# Patient Record
Sex: FEMALE | Race: WHITE | NOT HISPANIC OR LATINO | Employment: FULL TIME | URBAN - METROPOLITAN AREA
[De-identification: names, ages, dates, MRNs, and addresses within clinical notes are randomized per-mention and may not be internally consistent; named-entity substitution may affect disease eponyms.]

---

## 2022-05-11 PROBLEM — E66.813 CLASS 3 SEVERE OBESITY WITH SERIOUS COMORBIDITY AND BODY MASS INDEX (BMI) OF 40.0 TO 44.9 IN ADULT: Status: ACTIVE | Noted: 2022-05-11

## 2022-05-11 PROBLEM — E66.813 CLASS 3 SEVERE OBESITY WITH SERIOUS COMORBIDITY AND BODY MASS INDEX (BMI) OF 40.0 TO 44.9 IN ADULT: Chronic | Status: ACTIVE | Noted: 2022-05-11

## 2024-01-02 ENCOUNTER — OFFICE VISIT (OUTPATIENT)
Dept: FAMILY MEDICINE CLINIC | Facility: CLINIC | Age: 39
End: 2024-01-02
Payer: COMMERCIAL

## 2024-01-02 VITALS
WEIGHT: 258.6 LBS | SYSTOLIC BLOOD PRESSURE: 134 MMHG | HEART RATE: 84 BPM | HEIGHT: 64 IN | TEMPERATURE: 98.2 F | OXYGEN SATURATION: 98 % | BODY MASS INDEX: 44.15 KG/M2 | DIASTOLIC BLOOD PRESSURE: 68 MMHG

## 2024-01-02 DIAGNOSIS — Z72.0 TOBACCO ABUSE: ICD-10-CM

## 2024-01-02 DIAGNOSIS — I10 ESSENTIAL HYPERTENSION: Chronic | ICD-10-CM

## 2024-01-02 DIAGNOSIS — E66.01 CLASS 3 SEVERE OBESITY WITH SERIOUS COMORBIDITY AND BODY MASS INDEX (BMI) OF 40.0 TO 44.9 IN ADULT, UNSPECIFIED OBESITY TYPE: Chronic | ICD-10-CM

## 2024-01-02 DIAGNOSIS — Z3A.08 8 WEEKS GESTATION OF PREGNANCY: ICD-10-CM

## 2024-01-02 DIAGNOSIS — O24.419 GESTATIONAL DIABETES MELLITUS (GDM), ANTEPARTUM, GESTATIONAL DIABETES METHOD OF CONTROL UNSPECIFIED: Primary | Chronic | ICD-10-CM

## 2024-01-02 LAB
EXPIRATION DATE: NORMAL
HBA1C MFR BLD: 5 % (ref 4.5–5.7)
Lab: NORMAL

## 2024-01-02 RX ORDER — LABETALOL 200 MG/1
TABLET, FILM COATED ORAL EVERY 12 HOURS SCHEDULED
COMMUNITY
Start: 2023-12-15

## 2024-01-02 NOTE — PROGRESS NOTES
"Subjective   Darlene Lira is a 38 y.o. female.     History of Present Illness she has had gest diab with both preg.  She saw someone 2 weeks ago.  She is worried about diabetes with preg.  She is about 8 weeks.  She has been having some soa with quitting smoking.      The following portions of the patient's history were reviewed and updated as appropriate: allergies, current medications, past family history, past medical history, past social history, past surgical history, and problem list.    Review of Systems   Constitutional: Negative.    HENT: Negative.     Eyes: Negative.    Respiratory:  Positive for shortness of breath.    Cardiovascular: Negative.  Negative for chest pain and palpitations.   Gastrointestinal: Negative.    Endocrine: Negative.    Genitourinary: Negative.    Musculoskeletal: Negative.    Skin: Negative.    Allergic/Immunologic: Negative.    Neurological: Negative.  Negative for headaches.   Hematological: Negative.    Psychiatric/Behavioral: Negative.     All other systems reviewed and are negative.      Objective     Vitals:    01/02/24 1512   BP: 134/68   Pulse: 84   Temp: 98.2 °F (36.8 °C)   TempSrc: Infrared   SpO2: 98%   Weight: 117 kg (258 lb 9.6 oz)   Height: 162.6 cm (64.02\")       Physical Exam  Vitals and nursing note reviewed.   Constitutional:       Appearance: She is well-developed. She is obese.   HENT:      Head: Normocephalic and atraumatic.   Eyes:      General:         Right eye: No discharge.         Left eye: No discharge.      Pupils: Pupils are equal, round, and reactive to light.   Cardiovascular:      Rate and Rhythm: Normal rate and regular rhythm.      Heart sounds: Normal heart sounds.   Pulmonary:      Effort: Pulmonary effort is normal.      Breath sounds: Normal breath sounds.   Abdominal:      General: Bowel sounds are normal.      Palpations: Abdomen is soft. There is no mass.      Tenderness: There is no abdominal tenderness.   Musculoskeletal:         " General: Normal range of motion.      Right shoulder: No swelling.      Cervical back: Normal range of motion and neck supple.   Skin:     General: Skin is warm and dry.      Nails: There is no clubbing.   Neurological:      Mental Status: She is alert and oriented to person, place, and time.      Deep Tendon Reflexes: Reflexes are normal and symmetric.   Psychiatric:         Behavior: Behavior normal.         Thought Content: Thought content normal.         Judgment: Judgment normal.         Assessment & Plan     Problem List Items Addressed This Visit          Cardiac and Vasculature    Essential hypertension (Chronic)    Relevant Medications    labetalol (NORMODYNE) 200 MG tablet       Endocrine and Metabolic    Gestational diabetes mellitus (GDM) - Primary (Chronic)    Relevant Orders    POC Glycosylated Hemoglobin (Hb A1C) (Completed)    Ambulatory Referral to Endocrinology    Ambulatory Referral to Obstetrics / Gynecology    Class 3 severe obesity with serious comorbidity and body mass index (BMI) of 40.0 to 44.9 in adult (Chronic)       Gravid and     8 weeks gestation of pregnancy    Relevant Orders    Ambulatory Referral to Endocrinology    Ambulatory Referral to Obstetrics / Gynecology       Tobacco    Tobacco abuse       Poct A1c 5.0    Answers submitted by the patient for this visit:  Primary Reason for Visit (Submitted on 2023)  What is the primary reason for your visit?: High Blood Pressure

## 2024-01-09 ENCOUNTER — OFFICE VISIT (OUTPATIENT)
Dept: ENDOCRINOLOGY | Facility: CLINIC | Age: 39
End: 2024-01-09
Payer: COMMERCIAL

## 2024-01-09 VITALS
DIASTOLIC BLOOD PRESSURE: 74 MMHG | BODY MASS INDEX: 46.6 KG/M2 | HEART RATE: 86 BPM | WEIGHT: 263 LBS | OXYGEN SATURATION: 98 % | SYSTOLIC BLOOD PRESSURE: 152 MMHG | HEIGHT: 63 IN

## 2024-01-09 DIAGNOSIS — O24.419 GESTATIONAL DIABETES MELLITUS (GDM), ANTEPARTUM, GESTATIONAL DIABETES METHOD OF CONTROL UNSPECIFIED: Primary | Chronic | ICD-10-CM

## 2024-01-09 LAB
EXPIRATION DATE: NORMAL
GLUCOSE BLDC GLUCOMTR-MCNC: 109 MG/DL (ref 70–130)
Lab: NORMAL

## 2024-01-09 RX ORDER — LANCETS 30 GAUGE
EACH MISCELLANEOUS
Qty: 100 EACH | Refills: 8 | Status: SHIPPED | OUTPATIENT
Start: 2024-01-09

## 2024-01-09 RX ORDER — CHLORPHENIR/PHENYLEPH/ASPIRIN 2-7.8-325
1 TABLET, EFFERVESCENT ORAL DAILY
Qty: 50 STRIP | Refills: 5 | Status: SHIPPED | OUTPATIENT
Start: 2024-01-09

## 2024-01-09 RX ORDER — GLUCOSAMINE HCL/CHONDROITIN SU 500-400 MG
CAPSULE ORAL
Qty: 150 EACH | Refills: 9 | Status: SHIPPED | OUTPATIENT
Start: 2024-01-09

## 2024-01-09 NOTE — PROGRESS NOTES
Darlene Lira 38 y.o.  CC:Establish Care (New patient being seen at the request of Hodan Mohr MD for a consultation regarding hx of GD and now pregnant, DAVID 2024 OB: Morena Penn MD)      [place]: Establish Care (New patient being seen at the request of Hodan Mohr MD for a consultation regarding hx of GD and now pregnant, DAVID 2024 OB: Morena Penn MD)    Is  with 6 yo (7 lbs) and 8 yo (8 lbs)   Took basal bolus insulin with 8 yo, basal only with 6 yo   Currently 9 weeks pregnant   Both parents with blood sugar issues- father pre diabetic and mother with T2  Also known high bp - changed from losartan to normodyne  High initial bp here but repeat 128/64  She has not had morning meds yet  90 day average blood sugar recently / early pregnancy normal at 5.0%  She is not testing yet  We discussed the diagnosis of gestational diabetes and reviewed her glucose levels/ glucose tolerance test.  We discussed the concept of carbohydrate awaredness and carbohydrate content of meals was discussed.  Impact of sweets and other carb containing foods and types of foods typically high in carbohydrates was discussed. Overall guidelines for meal planning related to specific carbohydrate content of meals was discussed and she was provided recommendations about carbohydrates recommended with meals and with snacks.  She was asked to give up any sugared drinks, and avoid breakfast cereal.  Blood sugar testing fasting and after each meal was reviewed and the patient was taught to use a glucometer to test her blood sugar.  Normal values for blood sugar monitoring were discussed as well, with fasting level less than 95, 1 hour pp blood sugar less than 140 and 2 hour blood sugar less than 120 recommended.  Risks related to poor control of blood sugars during pregnancy were discussed with a focus on specific risks to both her and the baby.  Risks discussed include macrosomia (large birthweight), fetal lung immaturity  with respiratory distress and low oxygen level, stillbirth, low blood sugar after delivery, high bilirubin/jaundice, and hypocalcemia.  Use of medications during pregnancy (eg metformin, glyburide and insulin) was discussed.  Her long term risks (outside of pregnancy) for development of Diabetes Mellitus were reviewed and we discussed the importance of healthy lifestyle now and in the future to help reduce the risk of progression to diabetes.      No Known Allergies    Current Outpatient Medications:     Acetone, Urine, Test (Ketone Test) strip, 1 strip by In Vitro route Daily., Disp: 50 strip, Rfl: 5    Blood Glucose Monitoring Suppl w/Device kit, Use daily to test blood sugars, Disp: 1 each, Rfl: 0    cetirizine (zyrTEC) 10 MG tablet, Take 1 tablet by mouth Daily., Disp: , Rfl:     Glucose Blood (Blood Glucose Test) strip, Test blood sugars QID, Disp: 150 each, Rfl: 9    labetalol (NORMODYNE) 200 MG tablet, Every 12 (Twelve) Hours., Disp: , Rfl:     Lancets misc, Test blood sugars QID, Disp: 100 each, Rfl: 8    Turmeric 500 MG capsule, Take  by mouth Daily. With black pepper, Disp: , Rfl:   Patient Active Problem List    Diagnosis     8 weeks gestation of pregnancy [Z3A.08]     Annual physical exam [Z00.00]     Class 3 severe obesity with serious comorbidity and body mass index (BMI) of 40.0 to 44.9 in adult [E66.01, Z68.41]     Tobacco abuse [Z72.0]     Essential hypertension [I10]     Carpal tunnel syndrome of right wrist [G56.01]     Gestational diabetes mellitus (GDM) [O24.419]     Gestational diabetes [O24.419]      Review of Systems   Constitutional:  Negative for activity change, appetite change and unexpected weight change.   HENT:  Negative for congestion and rhinorrhea.    Eyes:  Negative for visual disturbance.   Respiratory:  Positive for shortness of breath. Negative for cough.    Cardiovascular:  Negative for palpitations and leg swelling.   Gastrointestinal:  Negative for constipation, diarrhea and  "nausea.   Genitourinary:  Negative for hematuria.   Musculoskeletal:  Negative for arthralgias, back pain, gait problem, joint swelling and myalgias.   Skin:  Negative for color change, rash and wound.   Allergic/Immunologic: Negative for environmental allergies, food allergies and immunocompromised state.   Neurological:  Negative for dizziness, weakness and light-headedness.   Psychiatric/Behavioral:  Negative for confusion, decreased concentration, dysphoric mood and sleep disturbance. The patient is not nervous/anxious.      Social History     Socioeconomic History    Marital status: Single   Tobacco Use    Smokeless tobacco: Never   Vaping Use    Vaping Use: Never used   Substance and Sexual Activity    Alcohol use: Yes     Comment: very rare    Drug use: Never    Sexual activity: Yes     Partners: Male     Family History   Problem Relation Age of Onset    Diabetes Mother     Hypertension Mother     Hypertension Father      /74   Pulse 86   Ht 160 cm (63\")   Wt 119 kg (263 lb)   LMP 05/01/2023   SpO2 98%   BMI 46.59 kg/m²   Physical Exam  Vitals and nursing note reviewed.   Constitutional:       Appearance: Normal appearance. She is well-developed.   HENT:      Head: Normocephalic and atraumatic.   Eyes:      General: Lids are normal.      Extraocular Movements: Extraocular movements intact.      Conjunctiva/sclera: Conjunctivae normal.      Pupils: Pupils are equal, round, and reactive to light.   Neck:      Thyroid: No thyroid mass or thyromegaly.      Vascular: No carotid bruit.      Trachea: Trachea normal. No tracheal deviation.   Cardiovascular:      Rate and Rhythm: Normal rate and regular rhythm.      Heart sounds: Normal heart sounds. No murmur heard.     No friction rub. No gallop.   Pulmonary:      Effort: Pulmonary effort is normal. No respiratory distress.      Breath sounds: Normal breath sounds. No wheezing.   Musculoskeletal:         General: No deformity. Normal range of motion.     "  Cervical back: Normal range of motion and neck supple.   Lymphadenopathy:      Cervical: No cervical adenopathy.   Skin:     General: Skin is warm and dry.      Findings: No erythema or rash.      Nails: There is no clubbing.   Neurological:      General: No focal deficit present.      Mental Status: She is alert and oriented to person, place, and time.      Cranial Nerves: No cranial nerve deficit.      Deep Tendon Reflexes: Reflexes are normal and symmetric. Reflexes normal.   Psychiatric:         Speech: Speech normal.         Behavior: Behavior normal.         Thought Content: Thought content normal.         Judgment: Judgment normal.       Results for orders placed or performed in visit on 01/09/24   POC Glucose    Specimen: Blood   Result Value Ref Range    Glucose 109 70 - 130 mg/dL    Lot Number [id number]     Expiration Date 06/30/2024      Diagnoses and all orders for this visit:    1. Gestational diabetes mellitus (GDM), antepartum, gestational diabetes method of control unspecified (Primary)  Assessment & Plan:  She will begin testing sugars fasting and 2 hours after meals and will fax blood sugars weekly via [employer]. It is likely she will need insulin again this pregnancy.  We discussed sensor use if she prefers- she will consider this.  We will plan to see her back in 3-4 weeks, or sooner if problems or questions.  Thank you for this referral and please do not hesitate to call for any problems or questions.    Orders:  -     POC Glucose    Other orders  -     Glucose Blood (Blood Glucose Test) strip; Test blood sugars QID  Dispense: 150 each; Refill: 9  -     Blood Glucose Monitoring Suppl w/Device kit; Use daily to test blood sugars  Dispense: 1 each; Refill: 0  -     Lancets misc; Test blood sugars QID  Dispense: 100 each; Refill: 8  -     Acetone, Urine, Test (Ketone Test) strip; 1 strip by In Vitro route Daily.  Dispense: 50 strip; Refill: 5    Return in about 6 weeks (around 2/20/2024) for  [place].    Madalyn Cleveland MD  Signed Madalyn Cleveland MD

## 2024-01-09 NOTE — ASSESSMENT & PLAN NOTE
She will begin testing sugars fasting and 2 hours after meals and will fax blood sugars weekly via [employer]. It is likely she will need insulin again this pregnancy.  We discussed sensor use if she prefers- she will consider this.  We will plan to see her back in 3-4 weeks, or sooner if problems or questions.  Thank you for this referral and please do not hesitate to call for any problems or questions.

## 2024-01-11 NOTE — TELEPHONE ENCOUNTER
Rx Refill Note  Requested Prescriptions     Pending Prescriptions Disp Refills    labetalol (NORMODYNE) 200 MG tablet       Sig: Every 12 (Twelve) Hours.      Last office visit with prescribing clinician: 1/2/2024   Last telemedicine visit with prescribing clinician: Visit date not found   Next office visit with prescribing clinician: Visit date not found                         Would you like a call back once the refill request has been completed: [] Yes [] No    If the office needs to give you a call back, can they leave a voicemail: [] Yes [] No    Dorina Li  01/11/24, 08:11 EST

## 2024-01-15 RX ORDER — LABETALOL 200 MG/1
200 TABLET, FILM COATED ORAL EVERY 12 HOURS SCHEDULED
Qty: 60 TABLET | Refills: 1 | Status: SHIPPED | OUTPATIENT
Start: 2024-01-15

## 2024-01-24 ENCOUNTER — TELEPHONE (OUTPATIENT)
Dept: OBSTETRICS AND GYNECOLOGY | Facility: CLINIC | Age: 39
End: 2024-01-24
Payer: COMMERCIAL

## 2024-01-25 ENCOUNTER — INITIAL PRENATAL (OUTPATIENT)
Dept: OBSTETRICS AND GYNECOLOGY | Facility: CLINIC | Age: 39
End: 2024-01-25
Payer: COMMERCIAL

## 2024-01-25 VITALS — BODY MASS INDEX: 46.69 KG/M2 | SYSTOLIC BLOOD PRESSURE: 128 MMHG | WEIGHT: 263.6 LBS | DIASTOLIC BLOOD PRESSURE: 62 MMHG

## 2024-01-25 DIAGNOSIS — Z34.91 PRENATAL CARE IN FIRST TRIMESTER: Primary | ICD-10-CM

## 2024-01-25 DIAGNOSIS — Z72.0 TOBACCO ABUSE: ICD-10-CM

## 2024-01-25 DIAGNOSIS — Z86.32 HISTORY OF GESTATIONAL DIABETES: ICD-10-CM

## 2024-01-25 DIAGNOSIS — Z3A.13 13 WEEKS GESTATION OF PREGNANCY: ICD-10-CM

## 2024-01-25 DIAGNOSIS — I10 ESSENTIAL HYPERTENSION: Chronic | ICD-10-CM

## 2024-01-25 DIAGNOSIS — O30.049 TWIN PREGNANCY, DICHORIONIC/DIAMNIOTIC, UNSPECIFIED TRIMESTER: ICD-10-CM

## 2024-01-25 DIAGNOSIS — Z98.890 HISTORY OF LOOP ELECTRICAL EXCISION PROCEDURE (LEEP): ICD-10-CM

## 2024-01-25 PROBLEM — O24.419 GESTATIONAL DIABETES MELLITUS (GDM): Chronic | Status: RESOLVED | Noted: 2017-01-25 | Resolved: 2024-01-25

## 2024-01-25 PROBLEM — Z34.90 PREGNANCY: Status: ACTIVE | Noted: 2024-01-25

## 2024-01-25 PROBLEM — Z3A.08 8 WEEKS GESTATION OF PREGNANCY: Status: RESOLVED | Noted: 2024-01-02 | Resolved: 2024-01-25

## 2024-01-25 PROBLEM — Z00.00 ANNUAL PHYSICAL EXAM: Status: RESOLVED | Noted: 2023-05-15 | Resolved: 2024-01-25

## 2024-01-25 PROBLEM — G56.01 CARPAL TUNNEL SYNDROME OF RIGHT WRIST: Status: RESOLVED | Noted: 2020-06-16 | Resolved: 2024-01-25

## 2024-01-25 PROCEDURE — 0501F PRENATAL FLOW SHEET: CPT | Performed by: OBSTETRICS & GYNECOLOGY

## 2024-01-25 RX ORDER — PRENATAL VIT/IRON FUM/FOLIC AC 27MG-0.8MG
TABLET ORAL DAILY
COMMUNITY
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
## 2024-01-26 LAB
ABO GROUP BLD: NORMAL
ALT SERPL-CCNC: 13 IU/L (ref 0–32)
AST SERPL-CCNC: 14 IU/L (ref 0–40)
BASOPHILS # BLD AUTO: 0 X10E3/UL (ref 0–0.2)
BASOPHILS NFR BLD AUTO: 0 %
BLD GP AB SCN SERPL QL: NEGATIVE
BUN SERPL-MCNC: 9 MG/DL (ref 6–20)
CREAT SERPL-MCNC: 0.63 MG/DL (ref 0.57–1)
CREAT UR-MCNC: ABNORMAL MG/DL
EGFRCR SERPLBLD CKD-EPI 2021: 118 ML/MIN/1.73
EOSINOPHIL # BLD AUTO: 0.2 X10E3/UL (ref 0–0.4)
EOSINOPHIL NFR BLD AUTO: 3 %
ERYTHROCYTE [DISTWIDTH] IN BLOOD BY AUTOMATED COUNT: 13.9 % (ref 11.7–15.4)
HBV SURFACE AG SERPL QL IA: NEGATIVE
HCT VFR BLD AUTO: 31.6 % (ref 34–46.6)
HCV IGG SERPL QL IA: NON REACTIVE
HGB BLD-MCNC: 10.6 G/DL (ref 11.1–15.9)
HIV 1+2 AB+HIV1 P24 AG SERPL QL IA: NON REACTIVE
IMM GRANULOCYTES # BLD AUTO: 0.1 X10E3/UL (ref 0–0.1)
IMM GRANULOCYTES NFR BLD AUTO: 1 %
LDH SERPL L TO P-CCNC: 154 IU/L (ref 119–226)
LYMPHOCYTES # BLD AUTO: 1.7 X10E3/UL (ref 0.7–3.1)
LYMPHOCYTES NFR BLD AUTO: 23 %
MCH RBC QN AUTO: 30 PG (ref 26.6–33)
MCHC RBC AUTO-ENTMCNC: 33.5 G/DL (ref 31.5–35.7)
MCV RBC AUTO: 90 FL (ref 79–97)
MICROALBUMIN UR-MCNC: ABNORMAL
MONOCYTES # BLD AUTO: 0.7 X10E3/UL (ref 0.1–0.9)
MONOCYTES NFR BLD AUTO: 9 %
NEUTROPHILS # BLD AUTO: 5 X10E3/UL (ref 1.4–7)
NEUTROPHILS NFR BLD AUTO: 64 %
PLATELET # BLD AUTO: 284 X10E3/UL (ref 150–450)
RBC # BLD AUTO: 3.53 X10E6/UL (ref 3.77–5.28)
RH BLD: NEGATIVE
RPR SER QL: NON REACTIVE
RUBV IGG SERPL IA-ACNC: 1.53 INDEX
SPECIMEN STATUS: NORMAL
URATE SERPL-MCNC: 3.9 MG/DL (ref 2.6–6.2)
WBC # BLD AUTO: 7.7 X10E3/UL (ref 3.4–10.8)

## 2024-01-27 LAB
BACTERIA UR CULT: NORMAL
BACTERIA UR CULT: NORMAL

## 2024-01-29 LAB — REF LAB TEST METHOD: NORMAL

## 2024-02-12 RX ORDER — FLURBIPROFEN SODIUM 0.3 MG/ML
1 SOLUTION/ DROPS OPHTHALMIC NIGHTLY
Qty: 100 EACH | Refills: 1 | Status: SHIPPED | OUTPATIENT
Start: 2024-02-12

## 2024-02-20 ENCOUNTER — OFFICE VISIT (OUTPATIENT)
Dept: ENDOCRINOLOGY | Facility: CLINIC | Age: 39
End: 2024-02-20
Payer: COMMERCIAL

## 2024-02-20 VITALS
WEIGHT: 267.6 LBS | HEART RATE: 92 BPM | BODY MASS INDEX: 47.41 KG/M2 | SYSTOLIC BLOOD PRESSURE: 132 MMHG | DIASTOLIC BLOOD PRESSURE: 60 MMHG | HEIGHT: 63 IN | OXYGEN SATURATION: 97 %

## 2024-02-20 DIAGNOSIS — I10 ESSENTIAL HYPERTENSION: Primary | Chronic | ICD-10-CM

## 2024-02-20 DIAGNOSIS — R73.9 HYPERGLYCEMIA: ICD-10-CM

## 2024-02-20 PROCEDURE — 99213 OFFICE O/P EST LOW 20 MIN: CPT | Performed by: INTERNAL MEDICINE

## 2024-02-20 RX ORDER — FERROUS SULFATE 325(65) MG
325 TABLET ORAL
COMMUNITY

## 2024-02-20 NOTE — PROGRESS NOTES
Darlene Ganfield 38 y.o.  CC:Follow-up and Gestational Diabetes (DAVID 7-30-24 (twins))      [place]: Follow-up and Gestational Diabetes (DAVID 7-30-24 (twins))    Blood sugars reviewed  Doing well overall   First morning sugar under 100 this morning   Is 17 weeks currently   Bp is good    No Known Allergies    Current Outpatient Medications:     Blood Glucose Monitoring Suppl w/Device kit, Use daily to test blood sugars, Disp: 1 each, Rfl: 0    cetirizine (zyrTEC) 10 MG tablet, Take 1 tablet by mouth Daily., Disp: , Rfl:     ferrous sulfate 325 (65 FE) MG tablet, Take 1 tablet by mouth Daily With Breakfast., Disp: , Rfl:     FIBER ADULT GUMMIES PO, Take  by mouth. 2 per day, Disp: , Rfl:     Glucose Blood (Blood Glucose Test) strip, Test blood sugars QID, Disp: 150 each, Rfl: 9    Insulin Glargine (LANTUS SOLOSTAR) 100 UNIT/ML injection pen, Inject 6 Units under the skin into the appropriate area as directed Every Night. (Patient taking differently: Inject  under the skin into the appropriate area as directed Every Night. Inject up to 25units daily), Disp: 15 mL, Rfl: 1    Insulin Pen Needle (B-D UF III MINI PEN NEEDLES) 31G X 5 MM misc, Use 1 Units Every Night., Disp: 100 each, Rfl: 1    labetalol (NORMODYNE) 200 MG tablet, Take 1 tablet by mouth Every 12 (Twelve) Hours., Disp: 60 tablet, Rfl: 1    Lancets misc, Test blood sugars QID, Disp: 100 each, Rfl: 8    Prenatal Vit-Fe Fumarate-FA (prenatal vitamin 27-0.8) 27-0.8 MG tablet tablet, Take  by mouth Daily., Disp: , Rfl:     Turmeric 500 MG capsule, Take  by mouth Daily. With black pepper, Disp: , Rfl:   Patient Active Problem List    Diagnosis     Hyperglycemia [R73.9]     Pregnancy [Z34.90]     Twin pregnancy, dichorionic/diamniotic, unspecified trimester [O30.049]     History of loop electrical excision procedure (LEEP) [Z98.890]     History of gestational diabetes [Z86.32]     Class 3 severe obesity with serious comorbidity and body mass index (BMI) of 40.0 to  "44.9 in adult [E66.01, Z68.41]     Tobacco abuse [Z72.0]     Essential hypertension [I10]      Review of Systems   Constitutional:  Negative for activity change, appetite change and unexpected weight change.   HENT:  Negative for congestion.    Respiratory:  Negative for cough and shortness of breath.    Cardiovascular:  Negative for leg swelling.     Social History     Socioeconomic History    Marital status: Single   Tobacco Use    Smoking status: Former     Packs/day: 0.50     Years: 20.00     Additional pack years: 0.00     Total pack years: 10.00     Types: Cigarettes     Start date: 2004     Quit date: 12/15/2023     Years since quittin.1    Smokeless tobacco: Never   Vaping Use    Vaping Use: Never used   Substance and Sexual Activity    Alcohol use: Not Currently     Comment: very rare    Drug use: Never    Sexual activity: Yes     Partners: Male     Birth control/protection: None     Family History   Problem Relation Age of Onset    Hypertension Father     Other Father         prediabetes    Diabetes type II Mother     Hypertension Mother      /60   Pulse 92   Ht 160 cm (63\")   Wt 121 kg (267 lb 9.6 oz)   LMP 2023   SpO2 97%   BMI 47.40 kg/m²   Physical Exam  Vitals and nursing note reviewed.   Constitutional:       Appearance: Normal appearance. She is well-developed.   HENT:      Head: Normocephalic and atraumatic.   Eyes:      General: Lids are normal.      Extraocular Movements: Extraocular movements intact.      Conjunctiva/sclera: Conjunctivae normal.      Pupils: Pupils are equal, round, and reactive to light.   Neck:      Thyroid: No thyroid mass or thyromegaly.      Vascular: No carotid bruit.      Trachea: Trachea normal. No tracheal deviation.   Cardiovascular:      Rate and Rhythm: Normal rate and regular rhythm.      Heart sounds: Normal heart sounds. No murmur heard.     No friction rub. No gallop.   Pulmonary:      Effort: Pulmonary effort is normal. No respiratory " distress.      Breath sounds: Normal breath sounds. No wheezing.   Musculoskeletal:         General: No deformity. Normal range of motion.      Cervical back: Normal range of motion and neck supple.   Lymphadenopathy:      Cervical: No cervical adenopathy.   Skin:     General: Skin is warm and dry.      Findings: No erythema or rash.      Nails: There is no clubbing.   Neurological:      Mental Status: She is alert and oriented to person, place, and time.      Cranial Nerves: No cranial nerve deficit.      Deep Tendon Reflexes: Reflexes are normal and symmetric. Reflexes normal.   Psychiatric:         Speech: Speech normal.         Behavior: Behavior normal.         Thought Content: Thought content normal.         Judgment: Judgment normal.       Results for orders placed or performed in visit on 01/25/24   Urine Culture - Urine, Urine, Clean Catch    Specimen: Urine, Clean Catch    UC   Result Value Ref Range    Urine Culture Final report     Result 1 Comment    Obstetric Panel    Specimen: Blood    UC   Result Value Ref Range    Hepatitis B Surface Ag Negative Negative    Hep C Virus Ab Non Reactive Non Reactive    RPR Non Reactive Non Reactive    Rubella Antibodies, IgG 1.53 Immune >0.99 index    ABO Type O     Rh Factor Negative     Antibody Screen Negative Negative   HIV-1 / O / 2 Ag / Antibody    Specimen: Blood    UC   Result Value Ref Range    HIV Screen 4th Gen w/RFX (Reference) Non Reactive Non Reactive   Specimen Status Report    UC   Result Value Ref Range    Specimen Status Comment    Preeclampsia Panel    UC   Result Value Ref Range    Uric Acid 3.9 2.6 - 6.2 mg/dL    BUN 9 6 - 20 mg/dL    Creatinine 0.63 0.57 - 1.00 mg/dL    EGFR Result 118 >59 mL/min/1.73     119 - 226 IU/L    AST (SGOT) 14 0 - 40 IU/L    ALT (SGPT) 13 0 - 32 IU/L    Creatinine, Urine CANCELED     Microalbumin, Urine CANCELED     WBC 7.7 3.4 - 10.8 x10E3/uL    RBC 3.53 (L) 3.77 - 5.28 x10E6/uL    Hemoglobin 10.6 (L) 11.1 -  15.9 g/dL    Hematocrit 31.6 (L) 34.0 - 46.6 %    MCV 90 79 - 97 fL    MCH 30.0 26.6 - 33.0 pg    MCHC 33.5 31.5 - 35.7 g/dL    RDW 13.9 11.7 - 15.4 %    Platelets 284 150 - 450 x10E3/uL    Neutrophil Rel % 64 Not Estab. %    Lymphocyte Rel % 23 Not Estab. %    Monocyte Rel % 9 Not Estab. %    Eosinophil Rel % 3 Not Estab. %    Basophil Rel % 0 Not Estab. %    Neutrophils Absolute 5.0 1.4 - 7.0 x10E3/uL    Lymphocytes Absolute 1.7 0.7 - 3.1 x10E3/uL    Monocytes Absolute 0.7 0.1 - 0.9 x10E3/uL    Eosinophils Absolute 0.2 0.0 - 0.4 x10E3/uL    Basophils Absolute 0.0 0.0 - 0.2 x10E3/uL    Immature Granulocyte Rel % 1 Not Estab. %    Immature Grans Absolute 0.1 0.0 - 0.1 x10E3/uL   LIQUID-BASED PAP SMEAR WITH HPV GENOTYPING REGARDLESS OF INTERPRETATION (EMANI,COR,MAD)    Specimen: Cervix, Endocervix; ThinPrep Vial   Result Value Ref Range    Reference Lab Report       [facility]  [address] [place] [address]  Phone: [phone] or [phone]  Fax: [phone]  Néstor Huff M.D., Medical Director    PATIENT NAME                           LABORATORY NO.  RAUDEL CABALLERO.                  [id number]  [id number]                         AGE              SEX  SSN           CLIENT REF #  [facility]                         38      1985  F    [id number]   [id number]  [id number]                                                [place] [address]                REQUESTING M.D.     ATTENDING M.D.     COPY TO.  PEBBLES RAUSCH  DATE COLLECTED      DATE RECEIVED      DATE REPORTED  2024    ThinPrep Pap with [facility]    DIAGNOSIS:  Negative for intraepithelial lesion or malignancy    Multiple factors can influence accuracy of Pap tests; therefore, screening at  regular intervals is necessary for early cancer detection.      SPECIMEN  ADEQUACY:  SATISFACTORY FOR EVALUATION  Transformation zone is absent or  insufficient.  SOURCE OF SPECIMEN:  CERVICAL  SLIDES:  1  CLINICAL HISTORY:  Prenatal care in first trimester    HPV  HR-HPV POOL: Negative    The Aptima HPV assay is an in vitro nucleic acid amplification test for the  qualitative detection of E6/E7 viral messenger RNA from 14 high risk types of  HPV in cervical specimens. The high risk HPV types detected include: 16, 18,  31, 33, 35, 39, 45, 51, 52, 56, 58, 59, 66, 68    Chlamydia / Gonorrhea  CHLAMYDIA TRACHOMATIS: Negative  NEISSERIA GONORRHOEAE: Negative    The Aptima Combo 2 assay is a target amplification nucleic acid probe test that  utilizes target capture for the in vitro qualitative detection and differentiation of  ribosomal RNA from Chlamydia trachomatis and Neisseria gonorrhoeae to aid  in the diagnosis of chlamdial and gonococcal disease using the [place] system.    CYTOTECHNOLOGIST:      TRAV LOPEZ (ASCP)    CPT CODES:  35248, 88023, 33028, 26563       Diagnoses and all orders for this visit:    1. Essential hypertension (Primary)  Assessment & Plan:  Doing well on current therapy - continue follow       2. Hyperglycemia  Assessment & Plan:  Blood sugar and 90 day average sugar reviewed  No high A1c   Failed gtt with twin gestation   High fasting sugars- is titrating insulin as directed and is very good about reporting  Follow up 6-8 weeks with weekly blood sugar   Discussed expected changes as she nears 25 weeks       Return in about 8 weeks (around 4/16/2024) for Recheck.    Madalyn Cleveland MD  Signed Madalyn Cleveland MD

## 2024-02-21 PROBLEM — R73.9 HYPERGLYCEMIA: Status: ACTIVE | Noted: 2024-02-21

## 2024-02-21 NOTE — ASSESSMENT & PLAN NOTE
Blood sugar and 90 day average sugar reviewed  No high A1c   Failed gtt with twin gestation   High fasting sugars- is titrating insulin as directed and is very good about reporting  Follow up 6-8 weeks with weekly blood sugar   Discussed expected changes as she nears 25 weeks

## 2024-02-27 ENCOUNTER — ROUTINE PRENATAL (OUTPATIENT)
Dept: OBSTETRICS AND GYNECOLOGY | Facility: CLINIC | Age: 39
End: 2024-02-27
Payer: COMMERCIAL

## 2024-02-27 ENCOUNTER — TELEPHONE (OUTPATIENT)
Dept: OBSTETRICS AND GYNECOLOGY | Facility: CLINIC | Age: 39
End: 2024-02-27

## 2024-02-27 VITALS — WEIGHT: 264.6 LBS | BODY MASS INDEX: 46.87 KG/M2 | DIASTOLIC BLOOD PRESSURE: 62 MMHG | SYSTOLIC BLOOD PRESSURE: 128 MMHG

## 2024-02-27 DIAGNOSIS — O30.049 TWIN PREGNANCY, DICHORIONIC/DIAMNIOTIC, UNSPECIFIED TRIMESTER: ICD-10-CM

## 2024-02-27 DIAGNOSIS — Z3A.18 18 WEEKS GESTATION OF PREGNANCY: ICD-10-CM

## 2024-02-27 DIAGNOSIS — Z98.890 HISTORY OF LOOP ELECTRICAL EXCISION PROCEDURE (LEEP): ICD-10-CM

## 2024-02-27 DIAGNOSIS — O24.414 INSULIN CONTROLLED GESTATIONAL DIABETES MELLITUS (GDM) DURING PREGNANCY, ANTEPARTUM: ICD-10-CM

## 2024-02-27 DIAGNOSIS — I10 ESSENTIAL HYPERTENSION: Chronic | ICD-10-CM

## 2024-02-27 DIAGNOSIS — Z34.92 PRENATAL CARE IN SECOND TRIMESTER: Primary | ICD-10-CM

## 2024-02-27 DIAGNOSIS — Z72.0 TOBACCO ABUSE: ICD-10-CM

## 2024-02-27 PROBLEM — R73.9 HYPERGLYCEMIA: Status: RESOLVED | Noted: 2024-02-21 | Resolved: 2024-02-27

## 2024-02-27 PROCEDURE — 0502F SUBSEQUENT PRENATAL CARE: CPT | Performed by: OBSTETRICS & GYNECOLOGY

## 2024-02-27 NOTE — PROGRESS NOTES
OB FOLLOW UP  CC- Here for care of pregnancy        Darlene Lira is a 38 y.o.  18w0d patient being seen today for her obstetrical follow up visit. Patient reports her average fasting BG is 90-95. Her average 2hr PP BG is .  She reports nausea that occurs in the morning for the past few day.  She also reports swelling in lower extremities without pitting that occurs sporadically.    Patient denies checking b/p at home. She is unable to leave urine sample at today's visit. She brought in 24 hour urine at today's visit.     Her prenatal care is complicated by (and status) :   Patient Active Problem List   Diagnosis    Essential hypertension    Class 3 severe obesity with serious comorbidity and body mass index (BMI) of 40.0 to 44.9 in adult    Tobacco abuse    Pregnancy    Twin pregnancy, dichorionic/diamniotic, unspecified trimester    History of loop electrical excision procedure (LEEP)    Insulin controlled gestational diabetes mellitus (GDM) during pregnancy, antepartum       Flu Status: Declines  Ultrasound Today: Yes  AFP was declined.    ROS -     Patient Denies: leaking of fluid, vaginal bleeding, dysuria, excessive vomiting, and more than 6 contractions per hour  Fetal Movement : No  All other systems reviewed and are negative.       The additional following portions of the patient's history were reviewed and updated as appropriate: allergies, current medications, past family history, past medical history, past social history, past surgical history, and problem list.      I have reviewed and agree with the HPI, ROS, and historical information as entered above. Morena Penn MD      /62   Wt 120 kg (264 lb 9.6 oz)   LMP 2023   BMI 46.87 kg/m²       EXAM:     Prenatal Vitals  BP: 128/62  Weight: 120 kg (264 lb 9.6 oz)   Fetal Heart Rate: +/+                  Assessment and Plan    Problem List Items Addressed This Visit          Gynecologic and Obstetric Problems    Insulin  controlled gestational diabetes mellitus (GDM) during pregnancy, antepartum    Overview     On insulin with G1 and G2  Seeing Dr Cleveland, started FSBS monitoring 12 wks- started insulin.         Relevant Medications    Insulin Glargine (LANTUS SOLOSTAR) 100 UNIT/ML injection pen       Other    Essential hypertension (Chronic)    Overview     Labetelol 200 BID at nob  Baseline PEP, 24h urine  Baby asa         Relevant Medications    labetalol (NORMODYNE) 200 MG tablet    Tobacco abuse    Overview     QUIT with +upt         Pregnancy    Overview     2 prev  term  Preeclampsia with G1 at term         Twin pregnancy, dichorionic/diamniotic, unspecified trimester    History of loop electrical excision procedure (LEEP)     Other Visit Diagnoses       Prenatal care in second trimester    -  Primary    Relevant Orders    [facility] [place]            Pregnancy at 18w0d  Refer to [facility] for crystal US.   Returned 24h urine.   Taking iron. Repeat cbc next visit.   FSBS normal on insulin.   Fetal status reassuring.   Activity and Exercise discussed.  Patient is on Prenatal vitamins  Return in about 2 weeks (around 3/12/2024) for Refer - [facility], F/U Prenatal.      Morena Penn MD  2024

## 2024-02-28 LAB
PROT 24H UR-MRATE: 438 MG/24 HR (ref 30–150)
PROT UR-MCNC: 14.6 MG/DL

## 2024-03-14 ENCOUNTER — OFFICE VISIT (OUTPATIENT)
Dept: OBSTETRICS AND GYNECOLOGY | Facility: HOSPITAL | Age: 39
End: 2024-03-14
Payer: COMMERCIAL

## 2024-03-14 ENCOUNTER — ROUTINE PRENATAL (OUTPATIENT)
Dept: OBSTETRICS AND GYNECOLOGY | Facility: CLINIC | Age: 39
End: 2024-03-14
Payer: COMMERCIAL

## 2024-03-14 ENCOUNTER — HOSPITAL ENCOUNTER (OUTPATIENT)
Dept: WOMENS IMAGING | Facility: HOSPITAL | Age: 39
Discharge: HOME | End: 2024-03-14
Admitting: OBSTETRICS & GYNECOLOGY
Payer: COMMERCIAL

## 2024-03-14 VITALS — BODY MASS INDEX: 46.84 KG/M2 | DIASTOLIC BLOOD PRESSURE: 62 MMHG | SYSTOLIC BLOOD PRESSURE: 134 MMHG | WEIGHT: 264.4 LBS

## 2024-03-14 VITALS — SYSTOLIC BLOOD PRESSURE: 126 MMHG | BODY MASS INDEX: 46.41 KG/M2 | DIASTOLIC BLOOD PRESSURE: 56 MMHG | WEIGHT: 262 LBS

## 2024-03-14 DIAGNOSIS — O09.522 MULTIGRAVIDA OF ADVANCED MATERNAL AGE IN SECOND TRIMESTER: ICD-10-CM

## 2024-03-14 DIAGNOSIS — I10 ESSENTIAL HYPERTENSION: Chronic | ICD-10-CM

## 2024-03-14 DIAGNOSIS — Z34.92 PRENATAL CARE IN SECOND TRIMESTER: ICD-10-CM

## 2024-03-14 DIAGNOSIS — E66.01 CLASS 3 SEVERE OBESITY WITH SERIOUS COMORBIDITY AND BODY MASS INDEX (BMI) OF 40.0 TO 44.9 IN ADULT, UNSPECIFIED OBESITY TYPE: Chronic | ICD-10-CM

## 2024-03-14 DIAGNOSIS — O30.049 TWIN PREGNANCY, DICHORIONIC/DIAMNIOTIC, UNSPECIFIED TRIMESTER: Primary | ICD-10-CM

## 2024-03-14 DIAGNOSIS — O24.414 INSULIN CONTROLLED GESTATIONAL DIABETES MELLITUS (GDM) DURING PREGNANCY, ANTEPARTUM: ICD-10-CM

## 2024-03-14 DIAGNOSIS — Z82.79 FAMILY HISTORY OF VSD (VENTRICULAR SEPTAL DEFECT): ICD-10-CM

## 2024-03-14 DIAGNOSIS — Z98.890 HISTORY OF LOOP ELECTRICAL EXCISION PROCEDURE (LEEP): ICD-10-CM

## 2024-03-14 PROCEDURE — 76812 OB US DETAILED ADDL FETUS: CPT

## 2024-03-14 PROCEDURE — 76811 OB US DETAILED SNGL FETUS: CPT

## 2024-03-14 NOTE — ASSESSMENT & PLAN NOTE
Patient with a history of cervical procedures already increased risk of short cervix and  birth.  Patient had additional high risk of spontaneous  birth secondary to twin gestation.  Cervical length today

## 2024-03-14 NOTE — ASSESSMENT & PLAN NOTE
Patient with a history of gestational diabetes requiring insulin x 2 and patient already requiring insulin during this pregnancy.  A1c at the beginning of pregnancy was normal though suspicion for potential type II given insulin requirements.  Patient currently follows with endocrinology and is on Lantus 26 units at night    GLUCOSE MONITORING   Target plasma blood glucose values in pregnancy are less than 90 mg/dL fasting, and less than 120 mg/dL at 2 hour postprandially or 140 mg/dL at 1 hour postprandially.     MATERNAL RISKS  Insulin resistance places women at higher risk for preeclampsia.  Those with gestational diabetes are also at higher risk of developing diabetes in their lifetime outside of pregnancy.  A 2-hour oral glucose tolerance test at 6 weeks postpartum should be completed to clarify a diagnosis of pregestational diabetes.  Screening for diabetes should be performed every 1-3 years after that.    FETAL AND  RISKS  Diabetes in pregnancy increases risk of macrosomia, with associated birth injuries including nerve palsies, fractures and cephalhematoma.  Macrosomia increases the need for  delivery and has been linked to the development of adolescent obesity.  Glycemic control is important to reducing these risks.       morbidities among infants of diabetic mothers include hypoglycemia, hyperbilirubinemia, and metabolic instability.  In addition, respiratory distress can result from delayed fetal lung maturity.    Intrapartum glycemic control to maintain maternal glucose levels within a narrow range (between  mg/dL) reduces risks of  asphyxia and  hypoglycemia.  Close monitoring of the  is necessary.      RECOMMENDATIONS:  -Continue keeping glucose log.  Would be happy to follow along with blood sugars if desired by endocrinology, primary OB/GYN, patient  -Patient will have growth ultrasounds every month  - Elective  delivery for suspected  macrosomia at estimated fetal weight of greater than 4500 grams.   - Plan for 2-hour OGTT at 6 weeks postpartum.   - Continuation of lifestyle changes PP to lower risk of type 2 DM in the future.

## 2024-03-14 NOTE — PROGRESS NOTES
"    OB FOLLOW UP  CC- Here for care of pregnancy        Darlene Lira is a 38 y.o.  20w2d patient being seen today for her obstetrical follow up visit. Patient reports her average fasting BG is . Her average 2hr PP BG is 88-95.. She is currently taking 26 units of Lantus. Pt does not monitor BP at home. Takes Labetalol 200mg BID. Denies any complaints today.     Pt returned 24H urine last visit and would like to discuss results.     Her prenatal care is complicated by (and status) :   Patient Active Problem List   Diagnosis    Essential hypertension    Class 3 severe obesity with serious comorbidity and body mass index (BMI) of 40.0 to 44.9 in adult    Tobacco abuse    Pregnancy    Twin pregnancy, dichorionic/diamniotic, unspecified trimester    History of loop electrical excision procedure (LEEP)    Insulin controlled gestational diabetes mellitus (GDM) during pregnancy, antepartum    Multigravida of advanced maternal age in second trimester    Family history of VSD (ventricular septal defect)       Flu Status: Declines    Ultrasound Today: Yes at [facility]  \"DC/DA twins in breech-breech presentation  Adequate growth of both twins  Intertwin growth is concordant (difference 9%)  Amniotic fluid is concordant   Normal appearing fetal anatomic survey of each twin though limited secondary to twin gestation, early gestation, maternal body habitus  No placenta previa  Normal cervical length\"    AFP was declined.    ROS -     Patient Denies: leaking of fluid, vaginal bleeding, dysuria, excessive vomiting, and more than 6 contractions per hour  Fetal Movement : Yes  All other systems reviewed and are negative.       The additional following portions of the patient's history were reviewed and updated as appropriate: allergies, current medications, past family history, past medical history, past social history, past surgical history, and problem list.      I have reviewed and agree with the HPI, ROS, and " historical information as entered above. Cecy Young, APRN      /62   Wt 120 kg (264 lb 6.4 oz)   LMP 2023   BMI 46.84 kg/m²       EXAM:     Prenatal Vitals  BP: 134/62  Weight: 120 kg (264 lb 6.4 oz)   Fetal Heart Rate: 149/142                  Assessment and Plan    Problem List Items Addressed This Visit          Cardiac and Vasculature    Essential hypertension (Chronic)    Overview     Labetelol 200 BID at nob  Baseline PEP, 24h urine  Baby asa         Relevant Medications    labetalol (NORMODYNE) 200 MG tablet    Other Relevant Orders    Cuffed BP Gauge ANEROID ANEROID Gauge Adult Large       Endocrine and Metabolic    Insulin controlled gestational diabetes mellitus (GDM) during pregnancy, antepartum    Overview     On insulin with G1 and G2  Seeing Dr Cleveland, started FSBS monitoring 12 wks- started insulin.         Relevant Medications    Insulin Glargine (LANTUS SOLOSTAR) 100 UNIT/ML injection pen       Gravid and     Twin pregnancy, dichorionic/diamniotic, unspecified trimester - Primary    Relevant Orders    POC Urinalysis Dipstick    Multigravida of advanced maternal age in second trimester       Pregnancy at 20w2d  Anatomy scan today is incomplete, follow up in 4 weeks for additional views. Anatomy that was visualized was within normal limits. Follow up at [facility] in four weeks for repeat views of hearts and spines  Fetal status reassuring.   Activity and Exercise discussed.  U/S ordered at follow up  Patient is on Prenatal vitamins  Discussed bASA for PIH prevention from 12 to 36wk  Rx bp cuff-instructed patient to take her bp prior to taking  her medication every day and record. Call if consistently over 140/90 with bp meds.  Reviewed Pre-eclampsia signs/symptoms-Headache not relieved by tylenol or rest, chest pain, shortness of breath, right upper quadrant pain, blurry vision. Monitor blood pressure at home call provider if above 140/90 consistently.     Follow up four  weeks.    Cecy Young, APRN  03/14/2024

## 2024-03-14 NOTE — PROGRESS NOTES
Pt has an appt with Dr. Penn today. Next appt with Dr. Cleveland on 4/16/24. Pt denies vaginal bleeding or abdominal pain. Pt has history of CHTN as of 2020. Pt reports her son was born with VSD and murmur, resolved. Hx preeclampsia. Pt is currently taking Lantus 26 units qHS and Labetalol 200 mg bid.

## 2024-03-14 NOTE — ASSESSMENT & PLAN NOTE
Regarding her Dichorionic - diamniotic twin pregnancy we discussed that twins are at an increased risk for  birth, gestational diabetes, and preeclampsia in pregnancy.  Dichorionic twins have less complications overall when compared to monochorionic twins but are increased from aquino pregnancies.  On average twins will delivery around 36 weeks. If you get to 38 weeks, we would recommend delivery. The only proven prevention strategy for preeclampsia is low dose aspirin during pregnancy, which we recommended today.  We discussed delivery of twin depends on position of baby A at delivery time and preferences of your delivery provider.    Recommendations:  - ASA 81 mg supplementation  - Baseline pre-eclampsia labs: serum labs wnl, 24 hour urine elevated at 428.  -Monthly growth ultrasounds  -Twice weekly NSTs at 32 weeks  -Delivery between 37-38 weeks gestation

## 2024-03-14 NOTE — ASSESSMENT & PLAN NOTE
Patient currently on labetalol 200 mg twice daily.  Blood pressure today 126/56.  No reason to make medication change at this time.    Pt has a diagnosis of chronic hypertension.  Initial evaluation in these women should include renal function by 24 hr urine for protein and creatinine clearance.  HTN is associated with Fetal Growth Restriction (FGR) in about 15-25% of cases. HTN progresses to superimposed preeclampsia in about 25-35% of cases, usually recognized by the appearance of proteinuria & worsening hypertension.  These interventions do not decrease the risk of superimposed preeclampsia: prophylactic bedrest, work & activity restrictions, dietary changes, nutrient supplements, or prophylactic antihypertensive therapy.      The only proven prevention strategy for preeclampsia is low dose aspirin during pregnancy.   In fact, the USPTF recommends low dose aspirin initiation after 12 wks gestation for preeclampsia prevention in these women.  We have instructed patient to check a blood pressure log and to report BPs 160/110s. With recent publishing of the CHAP trial, the goal for mild chronic hypertension is blood pressures <140/80. This trial showed decreased maternal morbidity with no change in  morbidity.    Recommendations:  -low dose aspirin  -Patient will be receiving monthly growth ultrasound secondary to multiple comorbidities.  - testing at 32 wks unless indication develops prior (usually with twice weekly NST's)   - Delivery based on BP control/complications but ideally at term

## 2024-03-14 NOTE — ASSESSMENT & PLAN NOTE
At age 35 and over, women are at increased risk for chromosome abnormalities, as well as at an increased risk for obstetric complications.  The patient is therefore a candidate for diagnostic (amnio or CVS) or screening (cell free fetal DNA or NIPT, nuchal translucency screening, or serum multiple markers) testing.  We discussed genetic testing and she currently declines all testing    She will be 38 years old at the time of delivery.  This will give her a risk of Down syndrome of 1/267 and a risk of any aneuploidy of 1/148.  She understands that ultrasound and/or serum screening cannot diagnose or rule out fetal aneuploidy.

## 2024-03-14 NOTE — ASSESSMENT & PLAN NOTE
Obesity increases the risk of many pregnancy complications including SAB, congenital anomalies (even in those without diabetes), macrosomia, diabetes, hypertension and preeclampsia, stillbirth, and  delivery. Obesity also decreases detection rates of fetal abnormalities on  ultrasound.  Ideal weight gain obese patients in pregnancy is 11-20 pounds; the [place] [employer] does not stratify weight gain by class of obesity.
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
## 2024-03-14 NOTE — ASSESSMENT & PLAN NOTE
Patient has a son that was born with a VSD.  Given this fetus has a first-degree relative with a congenital heart defect and potential concern for type 2 diabetes we will perform fetal echo at 24 weeks.

## 2024-03-18 RX ORDER — LABETALOL 200 MG/1
200 TABLET, FILM COATED ORAL EVERY 12 HOURS SCHEDULED
Qty: 60 TABLET | Refills: 1 | Status: SHIPPED | OUTPATIENT
Start: 2024-03-18

## 2024-03-18 NOTE — TELEPHONE ENCOUNTER
Rx Refill Note  Requested Prescriptions     Pending Prescriptions Disp Refills    labetalol (NORMODYNE) 200 MG tablet 60 tablet 1     Sig: Take 1 tablet by mouth Every 12 (Twelve) Hours.      Last office visit with prescribing clinician: 1/2/2024   Last telemedicine visit with prescribing clinician: Visit date not found   Next office visit with prescribing clinician: Visit date not found                         Would you like a call back once the refill request has been completed: [] Yes [] No    If the office needs to give you a call back, can they leave a voicemail: [] Yes [] No    Asmita Hutson MA  03/18/24, 07:57 EDT

## 2024-04-16 ENCOUNTER — OFFICE VISIT (OUTPATIENT)
Dept: ENDOCRINOLOGY | Facility: CLINIC | Age: 39
End: 2024-04-16
Payer: COMMERCIAL

## 2024-04-16 VITALS
DIASTOLIC BLOOD PRESSURE: 72 MMHG | OXYGEN SATURATION: 98 % | HEART RATE: 94 BPM | SYSTOLIC BLOOD PRESSURE: 150 MMHG | WEIGHT: 269.6 LBS | HEIGHT: 63 IN | BODY MASS INDEX: 47.77 KG/M2

## 2024-04-16 DIAGNOSIS — O24.414 INSULIN CONTROLLED GESTATIONAL DIABETES MELLITUS (GDM) DURING PREGNANCY, ANTEPARTUM: Primary | ICD-10-CM

## 2024-04-16 PROCEDURE — 99213 OFFICE O/P EST LOW 20 MIN: CPT | Performed by: INTERNAL MEDICINE

## 2024-04-16 RX ORDER — ASPIRIN 81 MG/1
81 TABLET ORAL DAILY
COMMUNITY

## 2024-04-16 NOTE — LETTER
April 16, 2024       No Recipients    Patient: Darlene Lira   YOB: 1985   Date of Visit: 4/16/2024     Dear Morena Penn MD:       Thank you for referring Darlene Lira to me for evaluation. Below are the relevant portions of my assessment and plan of care.    If you have questions, please do not hesitate to call me. I look forward to following Darlene along with you.         Sincerely,        Madalyn Cleveland MD        CC:   No Recipients    Madalyn Cleveland MD  04/16/24 1629  Incomplete  Darlene Lira 38 y.o.  CC:Follow-up and Gestational Diabetes (DAVID  7-30-24 (twins))    [place]: Follow-up and Gestational Diabetes (DAVID  7-30-24 (twins))    Is taking lantus 38 u daily   Fasting sugars 77-95  Pp sugars 85- 118   Babies moving well   Is seeing OB and PDC   Bp high today- recheck left arm 132/60    No Known Allergies    Current Outpatient Medications:   •  aspirin 81 MG EC tablet, Take 1 tablet by mouth Daily., Disp: , Rfl:   •  Blood Glucose Monitoring Suppl w/Device kit, Use daily to test blood sugars, Disp: 1 each, Rfl: 0  •  cetirizine (zyrTEC) 10 MG tablet, Take 1 tablet by mouth Daily., Disp: , Rfl:   •  ferrous sulfate 325 (65 FE) MG tablet, Take 1 tablet by mouth Daily With Breakfast., Disp: , Rfl:   •  FIBER ADULT GUMMIES PO, Take  by mouth. 2 per day, Disp: , Rfl:   •  Glucose Blood (Blood Glucose Test) strip, Test blood sugars QID, Disp: 150 each, Rfl: 9  •  Insulin Glargine (LANTUS SOLOSTAR) 100 UNIT/ML injection pen, Inject 40 Units under the skin into the appropriate area as directed Every Night. Please fill early- patient has adjusted dose during pregnancy, Disp: 15 mL, Rfl: 1  •  Insulin Pen Needle (B-D UF III MINI PEN NEEDLES) 31G X 5 MM misc, Use 1 Units Every Night., Disp: 100 each, Rfl: 1  •  labetalol (NORMODYNE) 200 MG tablet, Take 1 tablet by mouth Every 12 (Twelve) Hours., Disp: 60 tablet, Rfl: 1  •  Lancets misc, Test blood sugars QID, Disp: 100 each,  Rfl: 8  •  Prenatal Vit-Fe Fumarate-FA (prenatal vitamin 27-0.8) 27-0.8 MG tablet tablet, Take  by mouth Daily., Disp: , Rfl:   •  Turmeric 500 MG capsule, Take  by mouth Daily. With black pepper, Disp: , Rfl:   Patient Active Problem List    Diagnosis    • Multigravida of advanced maternal age in second trimester [O09.522]    • Family history of VSD (ventricular septal defect) [Z82.79]    • Pregnancy [Z34.90]    • Twin pregnancy, dichorionic/diamniotic, unspecified trimester [O30.049]    • History of loop electrical excision procedure (LEEP) [Z98.890]    • Insulin controlled gestational diabetes mellitus (GDM) during pregnancy, antepartum [O24.414]    • Class 3 severe obesity with serious comorbidity and body mass index (BMI) of 40.0 to 44.9 in adult [E66.01, Z68.41]    • Tobacco abuse [Z72.0]    • Essential hypertension [I10]      Review of Systems   Constitutional:  Negative for activity change, appetite change and unexpected weight change.   HENT:  Negative for congestion and rhinorrhea.    Eyes:  Negative for visual disturbance.   Respiratory:  Negative for cough and shortness of breath.    Cardiovascular:  Positive for leg swelling. Negative for palpitations.   Gastrointestinal:  Negative for constipation, diarrhea and nausea.   Genitourinary:  Negative for hematuria.   Musculoskeletal:  Negative for arthralgias, back pain, gait problem, joint swelling and myalgias.   Skin:  Negative for color change, rash and wound.   Allergic/Immunologic: Negative for environmental allergies, food allergies and immunocompromised state.   Neurological:  Negative for dizziness, weakness and light-headedness.   Psychiatric/Behavioral:  Negative for confusion, decreased concentration, dysphoric mood and sleep disturbance. The patient is not nervous/anxious.      Social History     Socioeconomic History   • Marital status: Single   Tobacco Use   • Smoking status: Former     Current packs/day: 0.00     Average packs/day: 0.5  "packs/day for 20.0 years (10.0 ttl pk-yrs)     Types: Cigarettes     Start date: 2004     Quit date: 12/15/2023     Years since quittin.3   • Smokeless tobacco: Never   Vaping Use   • Vaping status: Never Used   Substance and Sexual Activity   • Alcohol use: Not Currently     Comment: very rare   • Drug use: Never   • Sexual activity: Defer     Partners: Male     Birth control/protection: None     Family History   Problem Relation Age of Onset   • Hypertension Father    • Other Father         prediabetes   • Diabetes type II Mother    • Hypertension Mother      /72   Pulse 94   Ht 160 cm (63\")   Wt 122 kg (269 lb 9.6 oz)   LMP 2023   SpO2 98%   BMI 47.76 kg/m²   Physical Exam  Constitutional:       Appearance: Normal appearance.   Eyes:      Extraocular Movements: Extraocular movements intact.      Pupils: Pupils are equal, round, and reactive to light.   Cardiovascular:      Rate and Rhythm: Normal rate and regular rhythm.   Pulmonary:      Effort: Pulmonary effort is normal. No respiratory distress.   Neurological:      General: No focal deficit present.      Mental Status: She is oriented to person, place, and time.       Results for orders placed or performed in visit on 24   Protein, Urine, 24 Hour -   Result Value Ref Range    Total Protein, Urine 14.6 Not Estab. mg/dL    Protein, 24H Urine 438 (H) 30 - 150 mg/24 hr     Diagnoses and all orders for this visit:    1. Insulin controlled gestational diabetes mellitus (GDM) during pregnancy, antepartum (Primary)  Assessment & Plan:  Doing well overall   Is sending sugars in weekly   Taking lantus 38 u daily   Fasting sugars 77-95  Pp sugars 85- 118   No change for now but expected changes in blood sugars discussed and box of humalog provided for use prn   Currently 25 weeks         Electronically signed by: MD Megha Pretty Kristina D, MD  24 1609  Incomplete  Darlene Salcedo [place] 38 " y.o.  CC:Follow-up and Gestational Diabetes (DAVID  7-30-24 (twins))    [place]: Follow-up and Gestational Diabetes (DAVID  7-30-24 (twins))      No Known Allergies    Current Outpatient Medications:   •  aspirin 81 MG EC tablet, Take 1 tablet by mouth Daily., Disp: , Rfl:   •  Blood Glucose Monitoring Suppl w/Device kit, Use daily to test blood sugars, Disp: 1 each, Rfl: 0  •  cetirizine (zyrTEC) 10 MG tablet, Take 1 tablet by mouth Daily., Disp: , Rfl:   •  ferrous sulfate 325 (65 FE) MG tablet, Take 1 tablet by mouth Daily With Breakfast., Disp: , Rfl:   •  FIBER ADULT GUMMIES PO, Take  by mouth. 2 per day, Disp: , Rfl:   •  Glucose Blood (Blood Glucose Test) strip, Test blood sugars QID, Disp: 150 each, Rfl: 9  •  Insulin Glargine (LANTUS SOLOSTAR) 100 UNIT/ML injection pen, Inject 40 Units under the skin into the appropriate area as directed Every Night. Please fill early- patient has adjusted dose during pregnancy, Disp: 15 mL, Rfl: 1  •  Insulin Pen Needle (B-D UF III MINI PEN NEEDLES) 31G X 5 MM misc, Use 1 Units Every Night., Disp: 100 each, Rfl: 1  •  labetalol (NORMODYNE) 200 MG tablet, Take 1 tablet by mouth Every 12 (Twelve) Hours., Disp: 60 tablet, Rfl: 1  •  Lancets misc, Test blood sugars QID, Disp: 100 each, Rfl: 8  •  Prenatal Vit-Fe Fumarate-FA (prenatal vitamin 27-0.8) 27-0.8 MG tablet tablet, Take  by mouth Daily., Disp: , Rfl:   •  Turmeric 500 MG capsule, Take  by mouth Daily. With black pepper, Disp: , Rfl:   Patient Active Problem List    Diagnosis    • Multigravida of advanced maternal age in second trimester [O09.522]    • Family history of VSD (ventricular septal defect) [Z82.79]    • Pregnancy [Z34.90]    • Twin pregnancy, dichorionic/diamniotic, unspecified trimester [O30.049]    • History of loop electrical excision procedure (LEEP) [Z98.890]    • Insulin controlled gestational diabetes mellitus (GDM) during pregnancy, antepartum [O24.414]    • Class 3 severe obesity with serious comorbidity  "and body mass index (BMI) of 40.0 to 44.9 in adult [E66.01, Z68.41]    • Tobacco abuse [Z72.0]    • Essential hypertension [I10]      Review of Systems  Social History     Socioeconomic History   • Marital status: Single   Tobacco Use   • Smoking status: Former     Current packs/day: 0.00     Average packs/day: 0.5 packs/day for 20.0 years (10.0 ttl pk-yrs)     Types: Cigarettes     Start date: 2004     Quit date: 12/15/2023     Years since quittin.3   • Smokeless tobacco: Never   Vaping Use   • Vaping status: Never Used   Substance and Sexual Activity   • Alcohol use: Not Currently     Comment: very rare   • Drug use: Never   • Sexual activity: Defer     Partners: Male     Birth control/protection: None     Family History   Problem Relation Age of Onset   • Hypertension Father    • Other Father         prediabetes   • Diabetes type II Mother    • Hypertension Mother      /72   Pulse 94   Ht 160 cm (63\")   Wt 122 kg (269 lb 9.6 oz)   LMP 2023   SpO2 98%   BMI 47.76 kg/m²   Physical Exam  Results for orders placed or performed in visit on 24   Protein, Urine, 24 Hour -   Result Value Ref Range    Total Protein, Urine 14.6 Not Estab. mg/dL    Protein, 24H Urine 438 (H) 30 - 150 mg/24 hr     There are no diagnoses linked to this encounter.  Electronically signed by: Tiffanie Snell MA        "

## 2024-04-16 NOTE — PROGRESS NOTES
Darlene Salcedo [place] 38 y.o.  CC:Follow-up and Gestational Diabetes (DAVID  7-30-24 (twins))    [place]: Follow-up and Gestational Diabetes (DAVID  7-30-24 (twins))    Is taking lantus 38 u daily   Fasting sugars 77-95  Pp sugars 85- 118   Babies moving well   Is seeing OB and PDC   Bp high today- recheck left arm 132/60    No Known Allergies    Current Outpatient Medications:     aspirin 81 MG EC tablet, Take 1 tablet by mouth Daily., Disp: , Rfl:     Blood Glucose Monitoring Suppl w/Device kit, Use daily to test blood sugars, Disp: 1 each, Rfl: 0    cetirizine (zyrTEC) 10 MG tablet, Take 1 tablet by mouth Daily., Disp: , Rfl:     ferrous sulfate 325 (65 FE) MG tablet, Take 1 tablet by mouth Daily With Breakfast., Disp: , Rfl:     FIBER ADULT GUMMIES PO, Take  by mouth. 2 per day, Disp: , Rfl:     Glucose Blood (Blood Glucose Test) strip, Test blood sugars QID, Disp: 150 each, Rfl: 9    Insulin Glargine (LANTUS SOLOSTAR) 100 UNIT/ML injection pen, Inject 40 Units under the skin into the appropriate area as directed Every Night. Please fill early- patient has adjusted dose during pregnancy, Disp: 15 mL, Rfl: 1    Insulin Pen Needle (B-D UF III MINI PEN NEEDLES) 31G X 5 MM misc, Use 1 Units Every Night., Disp: 100 each, Rfl: 1    labetalol (NORMODYNE) 200 MG tablet, Take 1 tablet by mouth Every 12 (Twelve) Hours., Disp: 60 tablet, Rfl: 1    Lancets misc, Test blood sugars QID, Disp: 100 each, Rfl: 8    Prenatal Vit-Fe Fumarate-FA (prenatal vitamin 27-0.8) 27-0.8 MG tablet tablet, Take  by mouth Daily., Disp: , Rfl:     Turmeric 500 MG capsule, Take  by mouth Daily. With black pepper, Disp: , Rfl:   Patient Active Problem List    Diagnosis     Multigravida of advanced maternal age in second trimester [O09.522]     Family history of VSD (ventricular septal defect) [Z82.79]     Pregnancy [Z34.90]     Twin pregnancy, dichorionic/diamniotic, unspecified trimester [O30.049]     History of loop electrical excision procedure (LEEP)  [Z98.890]     Insulin controlled gestational diabetes mellitus (GDM) during pregnancy, antepartum [O24.414]     Class 3 severe obesity with serious comorbidity and body mass index (BMI) of 40.0 to 44.9 in adult [E66.01, Z68.41]     Tobacco abuse [Z72.0]     Essential hypertension [I10]      Review of Systems   Constitutional:  Negative for activity change, appetite change and unexpected weight change.   HENT:  Negative for congestion and rhinorrhea.    Eyes:  Negative for visual disturbance.   Respiratory:  Negative for cough and shortness of breath.    Cardiovascular:  Positive for leg swelling. Negative for palpitations.   Gastrointestinal:  Negative for constipation, diarrhea and nausea.   Genitourinary:  Negative for hematuria.   Musculoskeletal:  Negative for arthralgias, back pain, gait problem, joint swelling and myalgias.   Skin:  Negative for color change, rash and wound.   Allergic/Immunologic: Negative for environmental allergies, food allergies and immunocompromised state.   Neurological:  Negative for dizziness, weakness and light-headedness.   Psychiatric/Behavioral:  Negative for confusion, decreased concentration, dysphoric mood and sleep disturbance. The patient is not nervous/anxious.      Social History     Socioeconomic History    Marital status: Single   Tobacco Use    Smoking status: Former     Current packs/day: 0.00     Average packs/day: 0.5 packs/day for 20.0 years (10.0 ttl pk-yrs)     Types: Cigarettes     Start date: 2004     Quit date: 12/15/2023     Years since quittin.3    Smokeless tobacco: Never   Vaping Use    Vaping status: Never Used   Substance and Sexual Activity    Alcohol use: Not Currently     Comment: very rare    Drug use: Never    Sexual activity: Defer     Partners: Male     Birth control/protection: None     Family History   Problem Relation Age of Onset    Hypertension Father     Other Father         prediabetes    Diabetes type II Mother     Hypertension  "Mother      /72   Pulse 94   Ht 160 cm (63\")   Wt 122 kg (269 lb 9.6 oz)   LMP 11/04/2023   SpO2 98%   BMI 47.76 kg/m²   Physical Exam  Constitutional:       Appearance: Normal appearance.   Eyes:      Extraocular Movements: Extraocular movements intact.      Pupils: Pupils are equal, round, and reactive to light.   Cardiovascular:      Rate and Rhythm: Normal rate and regular rhythm.   Pulmonary:      Effort: Pulmonary effort is normal. No respiratory distress.   Neurological:      General: No focal deficit present.      Mental Status: She is oriented to person, place, and time.       Results for orders placed or performed in visit on 02/27/24   Protein, Urine, 24 Hour -   Result Value Ref Range    Total Protein, Urine 14.6 Not Estab. mg/dL    Protein, 24H Urine 438 (H) 30 - 150 mg/24 hr     Diagnoses and all orders for this visit:    1. Insulin controlled gestational diabetes mellitus (GDM) during pregnancy, antepartum (Primary)  Assessment & Plan:  Doing well overall   Is sending sugars in weekly   Taking lantus 38 u daily   Fasting sugars 77-95  Pp sugars 85- 118   No change for now but expected changes in blood sugars discussed and box of humalog provided for use prn   Currently 25 weeks       Return in about 5 weeks (around 5/21/2024) for Recheck.    Electronically signed by: Madalyn Cleveland MD        "

## 2024-04-16 NOTE — ASSESSMENT & PLAN NOTE
Doing well overall   Is sending sugars in weekly   Taking lantus 38 u daily   Fasting sugars 77-95  Pp sugars 85- 118   No change for now but expected changes in blood sugars discussed and box of humalog provided for use prn   Currently 25 weeks

## 2024-04-18 ENCOUNTER — HOSPITAL ENCOUNTER (OUTPATIENT)
Dept: WOMENS IMAGING | Facility: HOSPITAL | Age: 39
Discharge: HOME | End: 2024-04-18
Payer: COMMERCIAL

## 2024-04-18 ENCOUNTER — OFFICE VISIT (OUTPATIENT)
Dept: OBSTETRICS AND GYNECOLOGY | Facility: HOSPITAL | Age: 39
End: 2024-04-18
Payer: COMMERCIAL

## 2024-04-18 ENCOUNTER — ROUTINE PRENATAL (OUTPATIENT)
Dept: OBSTETRICS AND GYNECOLOGY | Facility: CLINIC | Age: 39
End: 2024-04-18
Payer: COMMERCIAL

## 2024-04-18 VITALS — BODY MASS INDEX: 47.3 KG/M2 | SYSTOLIC BLOOD PRESSURE: 134 MMHG | DIASTOLIC BLOOD PRESSURE: 58 MMHG | WEIGHT: 267 LBS

## 2024-04-18 VITALS — BODY MASS INDEX: 47.47 KG/M2 | DIASTOLIC BLOOD PRESSURE: 63 MMHG | SYSTOLIC BLOOD PRESSURE: 133 MMHG | WEIGHT: 268 LBS

## 2024-04-18 DIAGNOSIS — O24.414 INSULIN CONTROLLED GESTATIONAL DIABETES MELLITUS (GDM) DURING PREGNANCY, ANTEPARTUM: ICD-10-CM

## 2024-04-18 DIAGNOSIS — O30.049 TWIN PREGNANCY, DICHORIONIC/DIAMNIOTIC, UNSPECIFIED TRIMESTER: Primary | ICD-10-CM

## 2024-04-18 DIAGNOSIS — Z34.90 PRENATAL CARE, ANTEPARTUM: Primary | ICD-10-CM

## 2024-04-18 DIAGNOSIS — O09.522 MULTIGRAVIDA OF ADVANCED MATERNAL AGE IN SECOND TRIMESTER: ICD-10-CM

## 2024-04-18 DIAGNOSIS — E66.01 CLASS 3 SEVERE OBESITY WITH SERIOUS COMORBIDITY AND BODY MASS INDEX (BMI) OF 40.0 TO 44.9 IN ADULT, UNSPECIFIED OBESITY TYPE: Chronic | ICD-10-CM

## 2024-04-18 DIAGNOSIS — I10 ESSENTIAL HYPERTENSION: Chronic | ICD-10-CM

## 2024-04-18 DIAGNOSIS — O30.049 TWIN PREGNANCY, DICHORIONIC/DIAMNIOTIC, UNSPECIFIED TRIMESTER: ICD-10-CM

## 2024-04-18 LAB
GLUCOSE UR STRIP-MCNC: NEGATIVE MG/DL
PROT UR STRIP-MCNC: NEGATIVE MG/DL

## 2024-04-18 PROCEDURE — 76816 OB US FOLLOW-UP PER FETUS: CPT

## 2024-04-18 PROCEDURE — 93325 DOPPLER ECHO COLOR FLOW MAPG: CPT

## 2024-04-18 PROCEDURE — 0502F SUBSEQUENT PRENATAL CARE: CPT | Performed by: NURSE PRACTITIONER

## 2024-04-18 PROCEDURE — 76825 ECHO EXAM OF FETAL HEART: CPT

## 2024-04-18 NOTE — PROGRESS NOTES
OB FOLLOW UP  CC- Here for care of pregnancy        Darlene Lira is a 38 y.o.  25w2d patient being seen today for her obstetrical follow up visit. Patient reports her average fasting BG is <99. Her average 2hr PP BG is ..     Her prenatal care is complicated by (and status) :   Patient Active Problem List   Diagnosis    Essential hypertension    Class 3 severe obesity with serious comorbidity and body mass index (BMI) of 40.0 to 44.9 in adult    Tobacco abuse    Pregnancy    Twin pregnancy, dichorionic/diamniotic, unspecified trimester    History of loop electrical excision procedure (LEEP)    Insulin controlled gestational diabetes mellitus (GDM) during pregnancy, antepartum    Multigravida of advanced maternal age in second trimester    Family history of VSD (ventricular septal defect)       Flu Status: Declines  Ultrasound Today: Yes at PDC  Reviewed TDAP and Rhogam next visit.    ROS -   Patient Denies: leaking of fluid, vaginal bleeding, dysuria, excessive vomiting, and more than 6 contractions per hour  Fetal Movement : normal  All other systems reviewed and are negative.       The additional following portions of the patient's history were reviewed and updated as appropriate: allergies, current medications, past medical history, past social history, past surgical history, and problem list.      I have reviewed and agree with the HPI, ROS, and historical information as entered above. Brielle Saeed, APRN      /58   Wt 121 kg (267 lb)   LMP 2023   BMI 47.30 kg/m²       EXAM:     Prenatal Vitals  BP: 134/58  Weight: 121 kg (267 lb)   Fetal Heart Rate: PDC               Urine Glucose Read-only: Negative  Urine Protein Read-only: Negative       Assessment and Plan    Problem List Items Addressed This Visit          Cardiac and Vasculature    Essential hypertension (Chronic)    Overview     Labetelol 200 BID at nob  Baseline PEP, 24h urine  Baby asa         Relevant Medications     labetalol (NORMODYNE) 200 MG tablet       Endocrine and Metabolic    Class 3 severe obesity with serious comorbidity and body mass index (BMI) of 40.0 to 44.9 in adult (Chronic)    Insulin controlled gestational diabetes mellitus (GDM) during pregnancy, antepartum    Overview     On insulin with G1 and G2  Seeing Dr Cleveland, started FSBS monitoring 12 wks- started insulin.         Relevant Medications    Insulin Glargine (LANTUS SOLOSTAR) 100 UNIT/ML injection pen       Gravid and     Twin pregnancy, dichorionic/diamniotic, unspecified trimester    Multigravida of advanced maternal age in second trimester     Other Visit Diagnoses       Prenatal care, antepartum    -  Primary    Relevant Orders    POC Urinalysis Dipstick (Completed)            Pregnancy at 25w2d  Fetal status reassuring.  PDC US wnl today  CBC, Antibody screen, TDAP, RPR, and Rhogam  next visit. Instructions given  Fetal movement/PTL or Labor precautions  Discussed/encouraged TDAP vaccination after 28 weeks  Reviewed Pre-eclampsia signs/symptoms  Activity and Exercise discussed.  Return in about 4 weeks (around 2024) for after PDC; Rhogam.      Brielle Saeed, APRN  2024

## 2024-05-16 ENCOUNTER — ROUTINE PRENATAL (OUTPATIENT)
Dept: OBSTETRICS AND GYNECOLOGY | Facility: CLINIC | Age: 39
End: 2024-05-16
Payer: COMMERCIAL

## 2024-05-16 ENCOUNTER — HOSPITAL ENCOUNTER (OUTPATIENT)
Dept: WOMENS IMAGING | Facility: HOSPITAL | Age: 39
Discharge: HOME | End: 2024-05-16
Admitting: OBSTETRICS & GYNECOLOGY
Payer: COMMERCIAL

## 2024-05-16 ENCOUNTER — OFFICE VISIT (OUTPATIENT)
Dept: OBSTETRICS AND GYNECOLOGY | Facility: HOSPITAL | Age: 39
End: 2024-05-16
Payer: COMMERCIAL

## 2024-05-16 VITALS — DIASTOLIC BLOOD PRESSURE: 60 MMHG | BODY MASS INDEX: 47.83 KG/M2 | SYSTOLIC BLOOD PRESSURE: 145 MMHG | WEIGHT: 270 LBS

## 2024-05-16 VITALS — SYSTOLIC BLOOD PRESSURE: 138 MMHG | DIASTOLIC BLOOD PRESSURE: 64 MMHG | BODY MASS INDEX: 47.9 KG/M2 | WEIGHT: 270.4 LBS

## 2024-05-16 DIAGNOSIS — Z67.91 RH NEGATIVE, ANTEPARTUM: ICD-10-CM

## 2024-05-16 DIAGNOSIS — O24.414 INSULIN CONTROLLED GESTATIONAL DIABETES MELLITUS (GDM) DURING PREGNANCY, ANTEPARTUM: ICD-10-CM

## 2024-05-16 DIAGNOSIS — O30.049 TWIN PREGNANCY, DICHORIONIC/DIAMNIOTIC, UNSPECIFIED TRIMESTER: Primary | ICD-10-CM

## 2024-05-16 DIAGNOSIS — I10 ESSENTIAL HYPERTENSION: Chronic | ICD-10-CM

## 2024-05-16 DIAGNOSIS — O30.049 TWIN PREGNANCY, DICHORIONIC/DIAMNIOTIC, UNSPECIFIED TRIMESTER: ICD-10-CM

## 2024-05-16 DIAGNOSIS — Z34.93 PRENATAL CARE IN THIRD TRIMESTER: Primary | ICD-10-CM

## 2024-05-16 DIAGNOSIS — Z3A.29 29 WEEKS GESTATION OF PREGNANCY: ICD-10-CM

## 2024-05-16 DIAGNOSIS — O09.522 MULTIGRAVIDA OF ADVANCED MATERNAL AGE IN SECOND TRIMESTER: ICD-10-CM

## 2024-05-16 DIAGNOSIS — O26.899 RH NEGATIVE, ANTEPARTUM: ICD-10-CM

## 2024-05-16 DIAGNOSIS — O36.5921 IUGR (INTRAUTERINE GROWTH RESTRICTION) AFFECTING CARE OF MOTHER, SECOND TRIMESTER, FETUS 1: ICD-10-CM

## 2024-05-16 PROCEDURE — 76816 OB US FOLLOW-UP PER FETUS: CPT

## 2024-05-16 PROCEDURE — 76820 UMBILICAL ARTERY ECHO: CPT

## 2024-05-16 NOTE — LETTER
"May 18, 2024       No Recipients    Patient: Darlene Lira   YOB: 1985   Date of Visit: 2024       Dear Morena Penn MD,    Thank you for referring Darlene Lira to me for evaluation. Below is a copy of my consult note.    If you have questions, please do not hesitate to call me. I look forward to following Darlene along with you.         Sincerely,        Antonietta Zheng MD        CC:   No Recipients        Maternal/Fetal Medicine Follow Up Note     Name: Darlene Lira    : 1985     MRN: [id number]     Referring Provider: Morena Penn MD    Chief Complaint  di-di twins  T2DM   AMA       Subjective     History of Present Illness:  Darlene Lira is a 38 y.o.  29w4d who presents today for follow up   Reports good glycemic control   Follows with Endocrinology   Declined NIPS     DAVID: Estimated Date of Delivery: 24     ROS:   As noted in HPI.     Objective     Vital Signs  /60   Wt 122 kg (270 lb)   LMP 2023   Estimated body mass index is 47.83 kg/m² as calculated from the following:    Height as of 24: 160 cm (63\").    Weight as of this encounter: 122 kg (270 lb).    Ultrasound Impression:   See viewpoint      Assessment and Plan     Darlene Lira is a 38 y.o.  29w4d     Diagnoses and all orders for this visit:    1. Twin pregnancy, dichorionic/diamniotic, unspecified trimester (Primary)    2. Multigravida of advanced maternal age in second trimester    3. Insulin controlled gestational diabetes mellitus (GDM) during pregnancy, antepartum  Assessment & Plan:  Reports good control.   Being followed by Endocrine       4. Essential hypertension    5. IUGR (intrauterine growth restriction) affecting care of mother, second trimester, fetus 1  Assessment & Plan:  Twin A with mild AC lag and otherwise normal growth parameters, normal fluid/UA dopplers   Concordant growth   Plan repeat growth in 2 - 3 weeks   Recommend initiation " of twice weekly NSTs in your office              Follow Up  2 weeks     I spent 10 minutes caring for the patient on the day of service. This included: obtaining or reviewing a separately obtained medical history, reviewing patient records, performing a medically appropriate exam and/or evaluation, counseling or educating the patient/family/caregiver, ordering medications, labs, and/or procedures and documenting such in the medical record. This does not include time spent on review and interpretation of other tests such as fetal ultrasound or the performance of other procedures such as amniocentesis or CVS.    Antonietta Zheng MD FACOG  Maternal Fetal Medicine, [facility]     2024

## 2024-05-16 NOTE — PROGRESS NOTES
OB FOLLOW UP  CC- Here for care of pregnancy        Darlene Lira is a 38 y.o.  29w2d patient being seen today for her obstetrical follow up. Patient reports no complaints today.       MBT: O-  Rhogam: will be given today.  28 week packet: reviewed with patient , counseled on fetal movement , pediatrician list reviewed, breast pump discussed, and childbirth classes reviewed  TDAP: given today  Ultrasound Today: yes at PDC, report pending.     Her prenatal care is complicated by (and status) : see below. and GDM requiring insulin. Her average fasting BG is <95. Her average 2hr PP BG is  . Pt has HTN is taking 200mg labetalol and does not check her BP at home.   Patient Active Problem List   Diagnosis    Essential hypertension    Class 3 severe obesity with serious comorbidity and body mass index (BMI) of 40.0 to 44.9 in adult    Tobacco abuse    Pregnancy    Twin pregnancy, dichorionic/diamniotic, unspecified trimester    History of loop electrical excision procedure (LEEP)    Insulin controlled gestational diabetes mellitus (GDM) during pregnancy, antepartum    Multigravida of advanced maternal age in second trimester    Family history of VSD (ventricular septal defect)    Rh negative, antepartum         ROS -   Patient Denies: Loss of Fluid, Vaginal Spotting, Vision Changes, Headaches, Nausea , Vomiting , Contractions, Epigastric pain, and skin itching  Fetal Movement : normal    The additional following portions of the patient's history were reviewed and updated as appropriate: allergies and current medications.    I have reviewed and agree with the HPI, ROS, and historical information as entered above. Morena Penn MD      /64   Wt 123 kg (270 lb 6.4 oz)   LMP 2023   BMI 47.90 kg/m²         EXAM:     Prenatal Vitals  BP: 138/64  Weight: 123 kg (270 lb 6.4 oz)   Fetal Heart Rate: PDC                         Assessment and Plan    Problem List Items Addressed This Visit           Gynecologic and Obstetric Problems    Insulin controlled gestational diabetes mellitus (GDM) during pregnancy, antepartum    Overview     On insulin with G1 and G2  Seeing Dr Cleveland, started FSBS monitoring 12 wks- started insulin.         Relevant Medications    Insulin Glargine (LANTUS SOLOSTAR) 100 UNIT/ML injection pen       Other    Essential hypertension (Chronic)    Overview     Labetelol 200 BID at nob  Baseline PEP, 24h urine  Baby asa         Relevant Medications    labetalol (NORMODYNE) 200 MG tablet    Pregnancy    Overview     2 prev  term  Preeclampsia with G1 at term         Twin pregnancy, dichorionic/diamniotic, unspecified trimester    Multigravida of advanced maternal age in second trimester    Rh negative, antepartum     Other Visit Diagnoses       Prenatal care in third trimester    -  Primary    Relevant Orders    POC Urinalysis Dipstick    Rhogam Immune Globulin Immunization    CBC (No Diff)    Antibody Screen    RPR    Tdap Vaccine Greater Than or Equal To 8yo IM (Completed)            Pregnancy at 29w2d  BP stable on labetelol. FSBS normal on insulin.   PDC US today, no report yet, but normal per patient.   1 hr Glucola, CBC, RPR. Antibody screen, TDAP today, and Rhogam today  Fetal movement/PTL or Labor precautions  Activity and Exercise discussed.  Return in about 3 weeks (around 2024) for F/U Prenatal.        Morena Penn MD  2024

## 2024-05-16 NOTE — PROGRESS NOTES
"    Maternal/Fetal Medicine Follow Up Note     Name: Darlene Lira    : 1985     MRN: [id number]     Referring Provider: Morena Penn MD    Chief Complaint  di-di twins  T2DM   AMA       Subjective     History of Present Illness:  Darlene Lira is a 38 y.o.  29w4d who presents today for follow up   Reports good glycemic control   Follows with Endocrinology   Declined NIPS     DAVID: Estimated Date of Delivery: 24     ROS:   As noted in HPI.     Objective     Vital Signs  /60   Wt 122 kg (270 lb)   LMP 2023   Estimated body mass index is 47.83 kg/m² as calculated from the following:    Height as of 24: 160 cm (63\").    Weight as of this encounter: 122 kg (270 lb).    Ultrasound Impression:   See viewpoint      Assessment and Plan     Darlene Lira is a 38 y.o.  29w4d     Diagnoses and all orders for this visit:    1. Twin pregnancy, dichorionic/diamniotic, unspecified trimester (Primary)    2. Multigravida of advanced maternal age in second trimester    3. Insulin controlled gestational diabetes mellitus (GDM) during pregnancy, antepartum  Assessment & Plan:  Reports good control.   Being followed by Endocrine       4. Essential hypertension    5. IUGR (intrauterine growth restriction) affecting care of mother, second trimester, fetus 1  Assessment & Plan:  Twin A with mild AC lag and otherwise normal growth parameters, normal fluid/UA dopplers   Concordant growth   Plan repeat growth in 2 - 3 weeks   Recommend initiation of twice weekly NSTs in your office              Follow Up  2 weeks     I spent 10 minutes caring for the patient on the day of service. This included: obtaining or reviewing a separately obtained medical history, reviewing patient records, performing a medically appropriate exam and/or evaluation, counseling or educating the patient/family/caregiver, ordering medications, labs, and/or procedures and documenting such in the medical " record. This does not include time spent on review and interpretation of other tests such as fetal ultrasound or the performance of other procedures such as amniocentesis or CVS.    Antonietta Zheng MD FACOG  Maternal Fetal Medicine, [facility]     2024

## 2024-05-17 LAB
BLD GP AB SCN SERPL QL: NEGATIVE
ERYTHROCYTE [DISTWIDTH] IN BLOOD BY AUTOMATED COUNT: 15.2 % (ref 11.7–15.4)
HCT VFR BLD AUTO: 31.6 % (ref 34–46.6)
HGB BLD-MCNC: 10.1 G/DL (ref 11.1–15.9)
MCH RBC QN AUTO: 28.8 PG (ref 26.6–33)
MCHC RBC AUTO-ENTMCNC: 32 G/DL (ref 31.5–35.7)
MCV RBC AUTO: 90 FL (ref 79–97)
PLATELET # BLD AUTO: 235 X10E3/UL (ref 150–450)
RBC # BLD AUTO: 3.51 X10E6/UL (ref 3.77–5.28)
RPR SER QL: NON REACTIVE
WBC # BLD AUTO: 9.4 X10E3/UL (ref 3.4–10.8)

## 2024-05-18 PROBLEM — O36.5921 IUGR (INTRAUTERINE GROWTH RESTRICTION) AFFECTING CARE OF MOTHER, SECOND TRIMESTER, FETUS 1: Status: ACTIVE | Noted: 2024-05-18

## 2024-05-19 NOTE — ASSESSMENT & PLAN NOTE
Twin A with mild AC lag and otherwise normal growth parameters, normal fluid/UA dopplers   Concordant growth   Plan repeat growth in 2 - 3 weeks   Recommend initiation of twice weekly NSTs in your office

## 2024-05-20 ENCOUNTER — OFFICE VISIT (OUTPATIENT)
Dept: ENDOCRINOLOGY | Facility: CLINIC | Age: 39
End: 2024-05-20
Payer: COMMERCIAL

## 2024-05-20 VITALS
HEART RATE: 87 BPM | SYSTOLIC BLOOD PRESSURE: 138 MMHG | HEIGHT: 63 IN | OXYGEN SATURATION: 98 % | BODY MASS INDEX: 48.16 KG/M2 | DIASTOLIC BLOOD PRESSURE: 60 MMHG | WEIGHT: 271.8 LBS

## 2024-05-20 DIAGNOSIS — O24.414 INSULIN CONTROLLED GESTATIONAL DIABETES MELLITUS (GDM) DURING PREGNANCY, ANTEPARTUM: Primary | ICD-10-CM

## 2024-05-20 PROBLEM — Z01.419 NORMAL GYNECOLOGIC EXAMINATION: Status: ACTIVE | Noted: 2021-11-01

## 2024-05-20 PROBLEM — N92.6 MISSED MENSES: Status: ACTIVE | Noted: 2024-05-20

## 2024-05-20 LAB
EXPIRATION DATE: NORMAL
EXPIRATION DATE: NORMAL
GLUCOSE BLDC GLUCOMTR-MCNC: 116 MG/DL (ref 70–130)
HBA1C MFR BLD: 4.8 % (ref 4.5–5.7)
Lab: NORMAL
Lab: NORMAL

## 2024-05-20 PROCEDURE — 83036 HEMOGLOBIN GLYCOSYLATED A1C: CPT | Performed by: INTERNAL MEDICINE

## 2024-05-20 PROCEDURE — 99213 OFFICE O/P EST LOW 20 MIN: CPT | Performed by: INTERNAL MEDICINE

## 2024-05-20 PROCEDURE — 82947 ASSAY GLUCOSE BLOOD QUANT: CPT | Performed by: INTERNAL MEDICINE

## 2024-05-20 NOTE — ASSESSMENT & PLAN NOTE
Blood sugars are within target taking lantus 44 u daily at hs  She is titrating this medication actively   Has growth scan Thursday (multiples)  Babies moving well   Bp is good   No swelling   Close monitoring - h/o preeclampsia

## 2024-05-20 NOTE — PROGRESS NOTES
Darlene Ganfield 38 y.o.  CC:Follow-up and Gestational Diabetes (DAVID 7-30-24)      [place]: Follow-up and Gestational Diabetes (DAVID 7-30-24)    Taking lantus 44 u daily   Forgot blood sugars   BP is good taking normodyne  U/s Thursday   Blood sugars have been good  Will be 30 weeks tomorrow  Weight gain 3 lbs in past month  A1c reviewed  Results for orders placed or performed in visit on 05/20/24   POC Glycosylated Hemoglobin (Hb A1C)    Specimen: Blood   Result Value Ref Range    Hemoglobin A1C 4.8 4.5 - 5.7 %    Lot Number [id number]     Expiration Date 01/03/2026    POC Glucose, Blood    Specimen: Blood   Result Value Ref Range    Glucose 116 70 - 130 mg/dL    Lot Number [id number]     Expiration Date 11/02/2024      Babies moving well     No Known Allergies    Current Outpatient Medications:     aspirin 81 MG EC tablet, Take 1 tablet by mouth Daily., Disp: , Rfl:     Blood Glucose Monitoring Suppl w/Device kit, Use daily to test blood sugars, Disp: 1 each, Rfl: 0    cetirizine (zyrTEC) 10 MG tablet, Take 1 tablet by mouth Daily., Disp: , Rfl:     ferrous sulfate 325 (65 FE) MG tablet, Take 1 tablet by mouth Daily With Breakfast., Disp: , Rfl:     FIBER ADULT GUMMIES PO, Take  by mouth. 2 per day, Disp: , Rfl:     Glucose Blood (Blood Glucose Test) strip, Test blood sugars QID, Disp: 150 each, Rfl: 9    Insulin Glargine (LANTUS SOLOSTAR) 100 UNIT/ML injection pen, Inject 40 Units under the skin into the appropriate area as directed Every Night. Please fill early- patient has adjusted dose during pregnancy (Patient taking differently: Inject 44 Units under the skin into the appropriate area as directed Every Night. Please fill early- patient has adjusted dose during pregnancy), Disp: 15 mL, Rfl: 1    Insulin Pen Needle (B-D UF III MINI PEN NEEDLES) 31G X 5 MM misc, Use 1 Units Every Night., Disp: 100 each, Rfl: 1    labetalol (NORMODYNE) 200 MG tablet, Take 1 tablet by mouth Every 12 (Twelve) Hours., Disp: 60  tablet, Rfl: 1    Lancets misc, Test blood sugars QID, Disp: 100 each, Rfl: 8    Prenatal Vit-Fe Fumarate-FA (prenatal vitamin 27-0.8) 27-0.8 MG tablet tablet, Take  by mouth Daily., Disp: , Rfl:     Turmeric 500 MG capsule, Take  by mouth Daily. With black pepper, Disp: , Rfl:     Patient Active Problem List    Diagnosis     Missed menses [N92.6]     IUGR (intrauterine growth restriction) affecting care of mother, second trimester, fetus 1 [O36.5921]     Rh negative, antepartum [O26.899, Z67.91]     Multigravida of advanced maternal age in second trimester [O09.522]     Family history of VSD (ventricular septal defect) [Z82.79]     Pregnancy [Z34.90]     Twin pregnancy, dichorionic/diamniotic, unspecified trimester [O30.049]     History of loop electrical excision procedure (LEEP) [Z98.890]     Insulin controlled gestational diabetes mellitus (GDM) during pregnancy, antepartum [O24.414]     Class 3 severe obesity with serious comorbidity and body mass index (BMI) of 40.0 to 44.9 in adult [E66.01, Z68.41]     Tobacco abuse [Z72.0]     Normal gynecologic examination [Z01.419]     Essential hypertension [I10]     Diabetes 1.5, 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 Negative for appetite change and unexpected weight change.   HENT:  Negative for congestion and rhinorrhea.    Eyes:  Negative for visual disturbance.   Respiratory:  Negative for cough and shortness of breath.    Cardiovascular:  Positive for leg swelling. Negative for palpitations.   Gastrointestinal:  Negative for constipation, diarrhea and nausea.   Genitourinary:  Negative for hematuria.   Musculoskeletal:  Negative for arthralgias, back pain, gait problem, joint swelling and myalgias.   Skin:  Negative for color change, rash and wound.   Allergic/Immunologic: Negative for environmental allergies, food allergies and immunocompromised state.   Neurological:  Negative for dizziness, weakness and  "light-headedness.   Psychiatric/Behavioral:  Negative for confusion, decreased concentration, dysphoric mood and sleep disturbance. The patient is not nervous/anxious.      Social History     Socioeconomic History    Marital status: Single   Tobacco Use    Smoking status: Former     Current packs/day: 0.00     Average packs/day: 0.5 packs/day for 20.0 years (10.0 ttl pk-yrs)     Types: Cigarettes     Start date: 2004     Quit date: 2023     Years since quittin.4    Smokeless tobacco: Never   Vaping Use    Vaping status: Never Used   Substance and Sexual Activity    Alcohol use: Not Currently     Comment: very rare    Drug use: Never    Sexual activity: Yes     Partners: Male     Birth control/protection: Pill     Family History   Problem Relation Age of Onset    Hypertension Father     Other Father         prediabetes    Diabetes Father         Pre diabetic    Diabetes type II Mother     Hypertension Mother     Diabetes Mother     Diabetes Maternal Grandfather     Diabetes Maternal Grandmother         Pre diabetic    Diabetes Paternal Grandfather     Diabetes Maternal Uncle      /60   Pulse 87   Ht 160 cm (63\")   Wt 123 kg (271 lb 12.8 oz)   LMP 2023   SpO2 98%   BMI 48.15 kg/m²   Physical Exam  Vitals and nursing note reviewed.   Constitutional:       Appearance: Normal appearance. She is well-developed.   HENT:      Head: Normocephalic and atraumatic.   Eyes:      General: Lids are normal.      Extraocular Movements: Extraocular movements intact.      Conjunctiva/sclera: Conjunctivae normal.      Pupils: Pupils are equal, round, and reactive to light.   Neck:      Thyroid: No thyroid mass or thyromegaly.      Vascular: No carotid bruit.      Trachea: Trachea normal. No tracheal deviation.   Cardiovascular:      Rate and Rhythm: Normal rate and regular rhythm.      Pulses: Normal pulses.      Heart sounds: Normal heart sounds. No murmur heard.     No friction rub. No gallop. "   Pulmonary:      Effort: Pulmonary effort is normal. No respiratory distress.      Breath sounds: Normal breath sounds. No wheezing.   Musculoskeletal:         General: No deformity. Normal range of motion.      Cervical back: Normal range of motion and neck supple.      Right lower leg: Edema present.      Left lower leg: Edema present.   Lymphadenopathy:      Cervical: No cervical adenopathy.   Skin:     General: Skin is warm and dry.      Findings: No erythema or rash.      Nails: There is no clubbing.   Neurological:      Mental Status: She is alert and oriented to person, place, and time.      Cranial Nerves: No cranial nerve deficit.      Deep Tendon Reflexes: Reflexes are normal and symmetric. Reflexes normal.   Psychiatric:         Speech: Speech normal.         Behavior: Behavior normal.         Thought Content: Thought content normal.         Judgment: Judgment normal.       Results for orders placed or performed in visit on 05/20/24   POC Glycosylated Hemoglobin (Hb A1C)    Specimen: Blood   Result Value Ref Range    Hemoglobin A1C 4.8 4.5 - 5.7 %    Lot Number [id number]     Expiration Date 01/03/2026    POC Glucose, Blood    Specimen: Blood   Result Value Ref Range    Glucose 116 70 - 130 mg/dL    Lot Number [id number]     Expiration Date 11/02/2024      Diagnoses and all orders for this visit:    1. Insulin controlled gestational diabetes mellitus (GDM) during pregnancy, antepartum (Primary)  Assessment & Plan:  Blood sugars are within target taking lantus 44 u daily at hs  She is titrating this medication actively   Has growth scan Thursday (multiples)  Babies moving well   Bp is good   No swelling   Close monitoring - h/o preeclampsia    Orders:  -     POC Glycosylated Hemoglobin (Hb A1C)  -     POC Glucose, Blood    Return in about 4 weeks (around 6/17/2024) for Recheck.    Electronically signed by: Madalyn Cleveland MD

## 2024-05-21 ENCOUNTER — TELEPHONE (OUTPATIENT)
Dept: OBSTETRICS AND GYNECOLOGY | Facility: CLINIC | Age: 39
End: 2024-05-21
Payer: COMMERCIAL

## 2024-05-21 NOTE — TELEPHONE ENCOUNTER
Returned patient's call. Informed her that Dr. Penn stated lab show that her anemia is stable and recommends she continue iron supplement. Discussed that Vitamin C can increase iron absorption. She v/u and agreed.

## 2024-06-06 ENCOUNTER — HOSPITAL ENCOUNTER (OUTPATIENT)
Dept: WOMENS IMAGING | Facility: HOSPITAL | Age: 39
Discharge: HOME | End: 2024-06-06
Admitting: OBSTETRICS & GYNECOLOGY
Payer: COMMERCIAL

## 2024-06-06 ENCOUNTER — ROUTINE PRENATAL (OUTPATIENT)
Dept: OBSTETRICS AND GYNECOLOGY | Facility: CLINIC | Age: 39
End: 2024-06-06
Payer: COMMERCIAL

## 2024-06-06 ENCOUNTER — OFFICE VISIT (OUTPATIENT)
Dept: OBSTETRICS AND GYNECOLOGY | Facility: HOSPITAL | Age: 39
End: 2024-06-06
Payer: COMMERCIAL

## 2024-06-06 VITALS — WEIGHT: 277.2 LBS | BODY MASS INDEX: 49.1 KG/M2 | SYSTOLIC BLOOD PRESSURE: 152 MMHG | DIASTOLIC BLOOD PRESSURE: 70 MMHG

## 2024-06-06 VITALS — SYSTOLIC BLOOD PRESSURE: 150 MMHG | WEIGHT: 277 LBS | DIASTOLIC BLOOD PRESSURE: 68 MMHG | BODY MASS INDEX: 49.07 KG/M2

## 2024-06-06 DIAGNOSIS — O26.899 RH NEGATIVE, ANTEPARTUM: ICD-10-CM

## 2024-06-06 DIAGNOSIS — Z34.93 PRENATAL CARE IN THIRD TRIMESTER: Primary | ICD-10-CM

## 2024-06-06 DIAGNOSIS — O24.414 INSULIN CONTROLLED GESTATIONAL DIABETES MELLITUS (GDM) DURING PREGNANCY, ANTEPARTUM: ICD-10-CM

## 2024-06-06 DIAGNOSIS — Z82.79 FAMILY HISTORY OF VSD (VENTRICULAR SEPTAL DEFECT): ICD-10-CM

## 2024-06-06 DIAGNOSIS — O30.049 TWIN PREGNANCY, DICHORIONIC/DIAMNIOTIC, UNSPECIFIED TRIMESTER: ICD-10-CM

## 2024-06-06 DIAGNOSIS — Z67.91 RH NEGATIVE, ANTEPARTUM: ICD-10-CM

## 2024-06-06 DIAGNOSIS — O09.529 ANTEPARTUM MULTIGRAVIDA OF ADVANCED MATERNAL AGE: ICD-10-CM

## 2024-06-06 DIAGNOSIS — I10 ESSENTIAL HYPERTENSION: Chronic | ICD-10-CM

## 2024-06-06 DIAGNOSIS — O36.5921 IUGR (INTRAUTERINE GROWTH RESTRICTION) AFFECTING CARE OF MOTHER, SECOND TRIMESTER, FETUS 1: ICD-10-CM

## 2024-06-06 DIAGNOSIS — I10 ESSENTIAL HYPERTENSION: Primary | ICD-10-CM

## 2024-06-06 DIAGNOSIS — Z3A.32 32 WEEKS GESTATION OF PREGNANCY: ICD-10-CM

## 2024-06-06 PROBLEM — Z01.419 NORMAL GYNECOLOGIC EXAMINATION: Status: RESOLVED | Noted: 2021-11-01 | Resolved: 2024-06-06

## 2024-06-06 PROBLEM — N92.6 MISSED MENSES: Status: RESOLVED | Noted: 2024-05-20 | Resolved: 2024-06-06

## 2024-06-06 LAB
BILIRUB BLD-MCNC: NEGATIVE MG/DL
CLARITY, POC: CLEAR
COLOR UR: YELLOW
GLUCOSE UR STRIP-MCNC: NEGATIVE MG/DL
GLUCOSE UR STRIP-MCNC: NEGATIVE MG/DL
KETONES UR QL: NEGATIVE
LEUKOCYTE EST, POC: NEGATIVE
NITRITE UR-MCNC: NEGATIVE MG/ML
PH UR: 6 [PH] (ref 5–8)
PROT UR STRIP-MCNC: NEGATIVE MG/DL
PROT UR STRIP-MCNC: NEGATIVE MG/DL
RBC # UR STRIP: NEGATIVE /UL
SP GR UR: 1 (ref 1–1.03)
UROBILINOGEN UR QL: NORMAL

## 2024-06-06 PROCEDURE — 76820 UMBILICAL ARTERY ECHO: CPT

## 2024-06-06 PROCEDURE — 76819 FETAL BIOPHYS PROFIL W/O NST: CPT

## 2024-06-06 PROCEDURE — 76816 OB US FOLLOW-UP PER FETUS: CPT

## 2024-06-06 RX ORDER — MULTIVIT WITH MINERALS/LUTEIN
250 TABLET ORAL DAILY
COMMUNITY

## 2024-06-06 RX ORDER — LABETALOL 200 MG/1
200 TABLET, FILM COATED ORAL EVERY 8 HOURS
Qty: 60 TABLET | Refills: 1 | Status: SHIPPED | OUTPATIENT
Start: 2024-06-06

## 2024-06-06 NOTE — LETTER
"2024       No Recipients    Patient: Darlene Lira   YOB: 1985   Date of Visit: 2024       Dear Morena Penn MD,    Thank you for referring Darlene Lira to me for evaluation. Below is a copy of my consult note.    If you have questions, please do not hesitate to call me. I look forward to following Darlene along with you.         Sincerely,        Antonietta Zheng MD        CC:   No Recipients    Patient sees Dr. Penn today. NIPT declined. Denies complaints. Denies h/a, visual changes, or epigastric pain.        Maternal/Fetal Medicine Follow Up Note     Name: Darlene Lira    : 1985     MRN: [id number]     Referring Provider: Morena Penn MD    Chief Complaint  AMA, MO, di-di twins, small AC-A, T2DM, CHTN, prev child VS    Subjective     History of Present Illness:  Darlene Lira is a 38 y.o.  32w3d who presents today for follow up   Overall well   No interval issues   Still having mildly elevated fastings   Follows with Endocrinology   Taking Lantus in the PM     DAVID: Estimated Date of Delivery: 24     ROS:   As noted in HPI.     Objective     Vital Signs  /68   Wt 126 kg (277 lb)   LMP 2023   Estimated body mass index is 49.07 kg/m² as calculated from the following:    Height as of 24: 160 cm (63\").    Weight as of this encounter: 126 kg (277 lb).    Ultrasound Impression:   See viewpoint      Assessment and Plan     Darlene Lira is a 38 y.o.  32w3d     Diagnoses and all orders for this visit:    1. Essential hypertension (Primary)  -     POC Urinalysis Dipstick    2. IUGR (intrauterine growth restriction) affecting care of mother, second trimester, fetus 1    3. Insulin controlled gestational diabetes mellitus (GDM) during pregnancy, antepartum    4. Family history of VSD (ventricular septal defect)    5. Antepartum multigravida of advanced maternal age    6. Twin pregnancy, dichorionic/diamniotic, " unspecified trimester  Assessment & Plan:  Stable interval growth with slightly small AC for twin A   Otherwise reassuring appearing ultrasound   Taking Lantus in the PM and following with Endocrinology   Recommend starting twice weekly  testing in your office with repeat growth with MFM in 3 weeks              Follow Up  3 weeks     I spent 15 minutes caring for the patient on the day of service. This included: obtaining or reviewing a separately obtained medical history, reviewing patient records, performing a medically appropriate exam and/or evaluation, counseling or educating the patient/family/caregiver, ordering medications, labs, and/or procedures and documenting such in the medical record. This does not include time spent on review and interpretation of other tests such as fetal ultrasound or the performance of other procedures such as amniocentesis or CVS.    Antonietta Zheng MD FACOG  Maternal Fetal Medicine, [facility]     2024   Yes

## 2024-06-06 NOTE — PROGRESS NOTES
"      OB FOLLOW UP  CC- Here for care of pregnancy        Darlene Lira is a 38 y.o.  32w2d patient being seen today for her obstetrical follow up visit. She reports occasional nausea and constipation. Patient reports swelling in lower extremities with pitting. Patient states that she noticed swelling was much worse the other day, she checked b/p at work and it was within normal limits. She also reports white \"milky vaginal discharge\" for the past week. She denies vaginal odor or irritation.     Patient reports her average fasting BG is 85-95. Her average 2hr PP BG is less than 120. and her BP's at home have been 110's-120's/60's.     Her prenatal care is complicated by (and status) :  see below. and CHTN and GDM   Patient Active Problem List   Diagnosis    Essential hypertension    Class 3 severe obesity with serious comorbidity and body mass index (BMI) of 40.0 to 44.9 in adult    Tobacco abuse    Pregnancy    Twin pregnancy, dichorionic/diamniotic, unspecified trimester    History of loop electrical excision procedure (LEEP)    Insulin controlled gestational diabetes mellitus (GDM) during pregnancy, antepartum    Antepartum multigravida of advanced maternal age    Family history of VSD (ventricular septal defect)    Rh negative, antepartum    IUGR (intrauterine growth restriction) affecting care of mother, second trimester, fetus 1       TDAP status: received at last visit  Rhogam status: was given  28 week labs: Reviewed  Ultrasound Today: Yes (Done at [facility], results pending)  Non Stress Test: No.      ROS -   Patient Denies: Vaginal Spotting, Vision Changes, Contractions, Epigastric pain, and skin itching  Fetal Movement : normal  All other systems reviewed and are negative.       The additional following portions of the patient's history were reviewed and updated as appropriate: allergies, current medications, past family history, past medical history, past social history, past surgical history, and " problem list.    I have reviewed and agree with the HPI, ROS, and historical information as entered above. Morena Penn MD      /70   Wt 126 kg (277 lb 3.2 oz)   LMP 2023   BMI 49.10 kg/m²         EXAM:     Prenatal Vitals  BP: 152/70  Weight: 126 kg (277 lb 3.2 oz)   Fetal Heart Rate: PDC               Urine Glucose Read-only: Negative  Urine Protein Read-only: Negative           Assessment and Plan    Problem List Items Addressed This Visit          Gynecologic and Obstetric Problems    Insulin controlled gestational diabetes mellitus (GDM) during pregnancy, antepartum    Overview     On insulin with G1 and G2  Seeing Dr Cleveland, started FSBS monitoring 12 wks- started insulin.         Relevant Medications    Insulin Glargine (LANTUS SOLOSTAR) 100 UNIT/ML injection pen    Other Relevant Orders    Fetal Nonstress Test       Other    Essential hypertension (Chronic)    Overview     Labetelol 200 BID at nob  Baseline PEP, 24h urine  Baby asa         Relevant Medications    labetalol (NORMODYNE) 200 MG tablet    Other Relevant Orders    Fetal Nonstress Test    Pregnancy    Overview     2 prev  term  Preeclampsia with G1 at term         Twin pregnancy, dichorionic/diamniotic, unspecified trimester    Relevant Orders    Fetal Nonstress Test    Antepartum multigravida of advanced maternal age    Relevant Orders    Fetal Nonstress Test    Rh negative, antepartum     Other Visit Diagnoses       Prenatal care in third trimester    -  Primary    Relevant Orders    POC Urinalysis Dipstick (Completed)            Pregnancy at 32w2d. Increase her labetelol to TID. FSBS well controlled. She had PDC US today, but no report yet. Will start twice weekly monitoring and has FU with PDC sched. Babies were breech/breech on US today.   Fetal status reassuring.  28 week labs reviewed.    Activity and Exercise discussed.  Fetal movement/PTL or Labor precautions  Return in about 4 days (around 6/10/2024) for F/U  Prenatal, NST Next Visit.    Morena Penn MD  06/06/2024

## 2024-06-06 NOTE — PROGRESS NOTES
Patient sees Dr. Penn today. NIPT declined. Denies complaints. Denies h/a, visual changes, or epigastric pain.

## 2024-06-07 NOTE — ASSESSMENT & PLAN NOTE
Stable interval growth with slightly small AC for twin A   Otherwise reassuring appearing ultrasound   Taking Lantus in the PM and following with Endocrinology   Recommend starting twice weekly  testing in your office with repeat growth with MFM in 3 weeks

## 2024-06-07 NOTE — PROGRESS NOTES
"    Maternal/Fetal Medicine Follow Up Note     Name: Darlene Lira    : 1985     MRN: [id number]     Referring Provider: Morena Penn MD    Chief Complaint  AMA, MO, di-di twins, small AC-A, T2DM, CHTN, prev child VS    Subjective     History of Present Illness:  Darlene Lira is a 38 y.o.  32w3d who presents today for follow up   Overall well   No interval issues   Still having mildly elevated fastings   Follows with Endocrinology   Taking Lantus in the PM     DAVID: Estimated Date of Delivery: 24     ROS:   As noted in HPI.     Objective     Vital Signs  /68   Wt 126 kg (277 lb)   LMP 2023   Estimated body mass index is 49.07 kg/m² as calculated from the following:    Height as of 24: 160 cm (63\").    Weight as of this encounter: 126 kg (277 lb).    Ultrasound Impression:   See viewpoint      Assessment and Plan     Darlene Lira is a 38 y.o.  32w3d     Diagnoses and all orders for this visit:    1. Essential hypertension (Primary)  -     POC Urinalysis Dipstick    2. IUGR (intrauterine growth restriction) affecting care of mother, second trimester, fetus 1    3. Insulin controlled gestational diabetes mellitus (GDM) during pregnancy, antepartum    4. Family history of VSD (ventricular septal defect)    5. Antepartum multigravida of advanced maternal age    6. Twin pregnancy, dichorionic/diamniotic, unspecified trimester  Assessment & Plan:  Stable interval growth with slightly small AC for twin A   Otherwise reassuring appearing ultrasound   Taking Lantus in the PM and following with Endocrinology   Recommend starting twice weekly  testing in your office with repeat growth with MFM in 3 weeks              Follow Up  3 weeks     I spent 15 minutes caring for the patient on the day of service. This included: obtaining or reviewing a separately obtained medical history, reviewing patient records, performing a medically appropriate exam and/or " evaluation, counseling or educating the patient/family/caregiver, ordering medications, labs, and/or procedures and documenting such in the medical record. This does not include time spent on review and interpretation of other tests such as fetal ultrasound or the performance of other procedures such as amniocentesis or CVS.    Antonietta Zheng MD FACOG  Maternal Fetal Medicine, [facility]     2024

## 2024-06-11 ENCOUNTER — ROUTINE PRENATAL (OUTPATIENT)
Dept: OBSTETRICS AND GYNECOLOGY | Facility: CLINIC | Age: 39
End: 2024-06-11
Payer: COMMERCIAL

## 2024-06-11 VITALS — SYSTOLIC BLOOD PRESSURE: 138 MMHG | DIASTOLIC BLOOD PRESSURE: 62 MMHG | WEIGHT: 279 LBS | BODY MASS INDEX: 49.42 KG/M2

## 2024-06-11 DIAGNOSIS — O24.414 INSULIN CONTROLLED GESTATIONAL DIABETES MELLITUS (GDM) DURING PREGNANCY, ANTEPARTUM: ICD-10-CM

## 2024-06-11 DIAGNOSIS — O09.529 ANTEPARTUM MULTIGRAVIDA OF ADVANCED MATERNAL AGE: ICD-10-CM

## 2024-06-11 DIAGNOSIS — O30.049 TWIN PREGNANCY, DICHORIONIC/DIAMNIOTIC, UNSPECIFIED TRIMESTER: ICD-10-CM

## 2024-06-11 DIAGNOSIS — Z34.90 PRENATAL CARE, ANTEPARTUM: Primary | ICD-10-CM

## 2024-06-11 DIAGNOSIS — O36.5921 IUGR (INTRAUTERINE GROWTH RESTRICTION) AFFECTING CARE OF MOTHER, SECOND TRIMESTER, FETUS 1: ICD-10-CM

## 2024-06-11 DIAGNOSIS — E66.01 CLASS 3 SEVERE OBESITY WITH SERIOUS COMORBIDITY AND BODY MASS INDEX (BMI) OF 40.0 TO 44.9 IN ADULT, UNSPECIFIED OBESITY TYPE: Chronic | ICD-10-CM

## 2024-06-11 DIAGNOSIS — O36.8390 UNABLE TO HEAR FETAL HEART TONES AS REASON FOR ULTRASOUND SCAN: ICD-10-CM

## 2024-06-11 LAB
GLUCOSE UR STRIP-MCNC: NEGATIVE MG/DL
PROT UR STRIP-MCNC: NEGATIVE MG/DL

## 2024-06-11 PROCEDURE — 0502F SUBSEQUENT PRENATAL CARE: CPT

## 2024-06-11 NOTE — PROGRESS NOTES
OB FOLLOW UP  CC- Here for care of pregnancy        Darlene Lira is a 38 y.o.  33w0d patient being seen today for her obstetrical follow up visit. Patient reports no complaints and her average fasting BG is <95. Her average 2hr PP BG is <120..     Her prenatal care is complicated by (and status) : see below.  Patient Active Problem List   Diagnosis    Essential hypertension    Class 3 severe obesity with serious comorbidity and body mass index (BMI) of 40.0 to 44.9 in adult    Tobacco abuse    Pregnancy    Twin pregnancy, dichorionic/diamniotic, unspecified trimester    History of loop electrical excision procedure (LEEP)    Insulin controlled gestational diabetes mellitus (GDM) during pregnancy, antepartum    Antepartum multigravida of advanced maternal age    Family history of VSD (ventricular septal defect)    Rh negative, antepartum    IUGR (intrauterine growth restriction) affecting care of mother, second trimester, fetus 1       Flu Status: Already given in current flu season  Ultrasound Today: Yes  Non Stress Test: Yes minutes unable to trace both fetuss stat bpp 8/8 x2    ROS -   Patient Denies: Loss of Fluid, Vaginal Spotting, Vision Changes, Headaches, Nausea , Vomiting , Contractions, Epigastric pain, and skin itching  Fetal Movement : normal  All other systems reviewed and are negative.       The additional following portions of the patient's history were reviewed and updated as appropriate: allergies, current medications, past medical history, past social history, past surgical history, and problem list.    I have reviewed and agree with the HPI, ROS, and historical information as entered above. Cecy Young, APRN      /62   Wt 127 kg (279 lb)   LMP 2023   BMI 49.42 kg/m²       EXAM:     Prenatal Vitals  BP: 138/62  Weight: 127 kg (279 lb)   Fetal Heart Rate: BPP (Unable to trace both fetus at same time for nst-bpp stat)               Urine Glucose Read-only:  Negative  Urine Protein Read-only: Negative           Assessment and Plan    Problem List Items Addressed This Visit          Endocrine and Metabolic    Class 3 severe obesity with serious comorbidity and body mass index (BMI) of 40.0 to 44.9 in adult (Chronic)    Insulin controlled gestational diabetes mellitus (GDM) during pregnancy, antepartum    Overview     On insulin with G1 and G2  Seeing Dr Cleveland, started FSBS monitoring 12 wks- started insulin.         Relevant Medications    Insulin Glargine (LANTUS SOLOSTAR) 100 UNIT/ML injection pen       Gravid and     Twin pregnancy, dichorionic/diamniotic, unspecified trimester    Antepartum multigravida of advanced maternal age       Other    IUGR (intrauterine growth restriction) affecting care of mother, second trimester, fetus 1     Other Visit Diagnoses       Prenatal care, antepartum    -  Primary    Relevant Orders    POC Urinalysis Dipstick (Completed)    Unable to hear fetal heart tones as reason for ultrasound scan        Relevant Orders    US Fetal Biophysical Profile Without Non Stress Testing    US fetal biophysical wo nonstress testing ea add gest            Pregnancy at 33w0d  Fetal status reassuring. BPP 8/8   Activity and Exercise discussed.  Fetal movement/PTL or Labor precautions  Patient is on Prenatal vitamins  Reviewed FSBS goals: fasting <90, 2hr PP < 120  Reviewed Pre-eclampsia signs/symptoms  Discussed bASA for PIH prevention from 12 to 36wk  Desires Sterilization: Reviewed risks/benefits of BTL vs bilateral salpingectomy. Conset needs to be signed.  Follow up Friday for NST       Cecy Young, APRN  2024

## 2024-06-14 ENCOUNTER — ROUTINE PRENATAL (OUTPATIENT)
Dept: OBSTETRICS AND GYNECOLOGY | Facility: CLINIC | Age: 39
End: 2024-06-14
Payer: COMMERCIAL

## 2024-06-14 VITALS — SYSTOLIC BLOOD PRESSURE: 146 MMHG | WEIGHT: 276.2 LBS | BODY MASS INDEX: 48.93 KG/M2 | DIASTOLIC BLOOD PRESSURE: 60 MMHG

## 2024-06-14 DIAGNOSIS — O24.414 INSULIN CONTROLLED GESTATIONAL DIABETES MELLITUS (GDM) DURING PREGNANCY, ANTEPARTUM: ICD-10-CM

## 2024-06-14 DIAGNOSIS — Z3A.33 33 WEEKS GESTATION OF PREGNANCY: ICD-10-CM

## 2024-06-14 DIAGNOSIS — Z34.93 PRENATAL CARE, THIRD TRIMESTER: Primary | ICD-10-CM

## 2024-06-14 DIAGNOSIS — O30.049 TWIN PREGNANCY, DICHORIONIC/DIAMNIOTIC, UNSPECIFIED TRIMESTER: ICD-10-CM

## 2024-06-14 DIAGNOSIS — O36.5930 POOR FETAL GROWTH AFFECTING MANAGEMENT OF MOTHER IN THIRD TRIMESTER, SINGLE OR UNSPECIFIED FETUS: ICD-10-CM

## 2024-06-14 DIAGNOSIS — O09.529 ANTEPARTUM MULTIGRAVIDA OF ADVANCED MATERNAL AGE: ICD-10-CM

## 2024-06-14 LAB
EXPIRATION DATE: 0
GLUCOSE UR STRIP-MCNC: NEGATIVE MG/DL
Lab: 0
PROT UR STRIP-MCNC: NEGATIVE MG/DL

## 2024-06-14 NOTE — PROGRESS NOTES
OB FOLLOW UP  CC- Here for care of pregnancy        Darlene Lira is a 38 y.o.  33w3d patient being seen today for her obstetrical follow up visit. Patient reports she has had intermittent cramping since 0700 (uncertain if the babies or her constipation; becomes briefly intense with positional changes from sitting, then goes away. Denies dysuria), constipation (fiber gummies helping), nausea (last night; resolved on its own), and trace BLE edema (compression socks and elevated extremities help). She reports she is not taking her B/Ps at home. Fasting glucoses running <95; postprandials <120. Insulin has increased to 48U.    Her prenatal care is complicated by (and status): see below.  Patient Active Problem List   Diagnosis    Essential hypertension    Class 3 severe obesity with serious comorbidity and body mass index (BMI) of 40.0 to 44.9 in adult    Tobacco abuse    Pregnancy    Twin pregnancy, dichorionic/diamniotic, unspecified trimester    History of loop electrical excision procedure (LEEP)    Insulin controlled gestational diabetes mellitus (GDM) during pregnancy, antepartum    Antepartum multigravida of advanced maternal age    Family history of VSD (ventricular septal defect)    Rh negative, antepartum    IUGR (intrauterine growth restriction) affecting care of mother, second trimester, fetus 1       Flu Status:  not currently flu season  Ultrasound Today: No  Non Stress Test: Yes, 20+ minutes  non-stress test: NST: Reactive x2  indication:  gestational diabetes (insulin-controlled), chronic hypertension, IUGR, AMA, and di/di twins  category: Category I      ROS -   Patient Denies: Loss of Fluid, Vaginal Spotting, Vision Changes, Headaches, Vomiting , Contractions, and skin itching  Fetal Movement: normal  All other systems reviewed and are negative.       The additional following portions of the patient's history were reviewed and updated as appropriate: allergies, current medications,  past family history, past medical history, past social history, past surgical history, and problem list.    I have reviewed and agree with the HPI, ROS, and historical information as entered above. Brielle Saeed, APRN      /60   Wt 125 kg (276 lb 3.2 oz)   LMP 2023   BMI 48.93 kg/m²       EXAM:     Prenatal Vitals  BP: 146/60  Weight: 125 kg (276 lb 3.2 oz)   Fetal Heart Rate: NST+ A/B               Urine Glucose Read-only: Negative  Urine Protein Read-only: Negative           Assessment and Plan    Problem List Items Addressed This Visit          Endocrine and Metabolic    Insulin controlled gestational diabetes mellitus (GDM) during pregnancy, antepartum    Overview     On insulin with G1 and G2  Seeing Dr Cleveland, started FSBS monitoring 12 wks- started insulin.         Relevant Medications    Insulin Glargine (LANTUS SOLOSTAR) 100 UNIT/ML injection pen       Gravid and     Pregnancy    Overview     2 prev  term  Preeclampsia with G1 at term         Relevant Orders    POC Glucose, Urine, Qualitative, Dipstick (Completed)    POC Protein, Urine, Qualitative, Dipstick (Completed)    Twin pregnancy, dichorionic/diamniotic, unspecified trimester    Antepartum multigravida of advanced maternal age       Other    IUGR (intrauterine growth restriction) affecting care of mother, second trimester, fetus 1     Other Visit Diagnoses       Prenatal care, third trimester    -  Primary    Relevant Orders    POC Glucose, Urine, Qualitative, Dipstick (Completed)    POC Protein, Urine, Qualitative, Dipstick (Completed)            Pregnancy at 33w3d  Fetal status reassuring.   Activity and Exercise discussed.  Fetal movement/PTL or Labor precautions  Patient is on Prenatal vitamins  Reviewed Pre-eclampsia signs/symptoms  Return in about 4 days (around 2024) for NST.    Brielle Saeed, APRN  2024

## 2024-06-18 ENCOUNTER — HOSPITAL ENCOUNTER (OUTPATIENT)
Facility: HOSPITAL | Age: 39
Discharge: HOME | End: 2024-06-18
Attending: OBSTETRICS & GYNECOLOGY | Admitting: OBSTETRICS & GYNECOLOGY
Payer: COMMERCIAL

## 2024-06-18 ENCOUNTER — ROUTINE PRENATAL (OUTPATIENT)
Dept: OBSTETRICS AND GYNECOLOGY | Facility: CLINIC | Age: 39
End: 2024-06-18
Payer: COMMERCIAL

## 2024-06-18 VITALS — WEIGHT: 278 LBS | SYSTOLIC BLOOD PRESSURE: 140 MMHG | DIASTOLIC BLOOD PRESSURE: 68 MMHG | BODY MASS INDEX: 49.25 KG/M2

## 2024-06-18 VITALS
SYSTOLIC BLOOD PRESSURE: 145 MMHG | HEIGHT: 63 IN | RESPIRATION RATE: 17 BRPM | BODY MASS INDEX: 49.25 KG/M2 | TEMPERATURE: 98.2 F | OXYGEN SATURATION: 97 % | HEART RATE: 81 BPM | DIASTOLIC BLOOD PRESSURE: 77 MMHG

## 2024-06-18 DIAGNOSIS — Z3A.34 34 WEEKS GESTATION OF PREGNANCY: ICD-10-CM

## 2024-06-18 DIAGNOSIS — O30.049 TWIN PREGNANCY, DICHORIONIC/DIAMNIOTIC, UNSPECIFIED TRIMESTER: ICD-10-CM

## 2024-06-18 DIAGNOSIS — O09.529 ANTEPARTUM MULTIGRAVIDA OF ADVANCED MATERNAL AGE: ICD-10-CM

## 2024-06-18 DIAGNOSIS — Z67.91 RH NEGATIVE, ANTEPARTUM: ICD-10-CM

## 2024-06-18 DIAGNOSIS — I10 ESSENTIAL HYPERTENSION: Chronic | ICD-10-CM

## 2024-06-18 DIAGNOSIS — O36.5921 IUGR (INTRAUTERINE GROWTH RESTRICTION) AFFECTING CARE OF MOTHER, SECOND TRIMESTER, FETUS 1: ICD-10-CM

## 2024-06-18 DIAGNOSIS — O28.8 NON-REACTIVE NST (NON-STRESS TEST): ICD-10-CM

## 2024-06-18 DIAGNOSIS — O24.414 INSULIN CONTROLLED GESTATIONAL DIABETES MELLITUS (GDM) DURING PREGNANCY, ANTEPARTUM: Primary | ICD-10-CM

## 2024-06-18 DIAGNOSIS — O26.899 RH NEGATIVE, ANTEPARTUM: ICD-10-CM

## 2024-06-18 LAB
ALP SERPL-CCNC: 99 U/L (ref 39–117)
ALT SERPL W P-5'-P-CCNC: 16 U/L (ref 1–33)
AST SERPL-CCNC: 20 U/L (ref 1–32)
BILIRUB SERPL-MCNC: 0.6 MG/DL (ref 0–1.2)
CREAT SERPL-MCNC: 0.39 MG/DL (ref 0.57–1)
DEPRECATED RDW RBC AUTO: 50.1 FL (ref 37–54)
ERYTHROCYTE [DISTWIDTH] IN BLOOD BY AUTOMATED COUNT: 15.6 % (ref 12.3–15.4)
HCT VFR BLD AUTO: 30.3 % (ref 34–46.6)
HGB BLD-MCNC: 10.1 G/DL (ref 12–15.9)
LDH SERPL-CCNC: 179 U/L (ref 135–214)
MCH RBC QN AUTO: 29.5 PG (ref 26.6–33)
MCHC RBC AUTO-ENTMCNC: 33.3 G/DL (ref 31.5–35.7)
MCV RBC AUTO: 88.6 FL (ref 79–97)
PLATELET # BLD AUTO: 221 10*3/MM3 (ref 140–450)
PMV BLD AUTO: 9.6 FL (ref 6–12)
RBC # BLD AUTO: 3.42 10*6/MM3 (ref 3.77–5.28)
URATE SERPL-MCNC: 4.2 MG/DL (ref 2.4–5.7)
WBC NRBC COR # BLD AUTO: 10.99 10*3/MM3 (ref 3.4–10.8)

## 2024-06-18 PROCEDURE — 83615 LACTATE (LD) (LDH) ENZYME: CPT | Performed by: OBSTETRICS & GYNECOLOGY

## 2024-06-18 PROCEDURE — 84450 TRANSFERASE (AST) (SGOT): CPT | Performed by: OBSTETRICS & GYNECOLOGY

## 2024-06-18 PROCEDURE — 82247 BILIRUBIN TOTAL: CPT | Performed by: OBSTETRICS & GYNECOLOGY

## 2024-06-18 PROCEDURE — 84460 ALANINE AMINO (ALT) (SGPT): CPT | Performed by: OBSTETRICS & GYNECOLOGY

## 2024-06-18 PROCEDURE — 0502F SUBSEQUENT PRENATAL CARE: CPT | Performed by: OBSTETRICS & GYNECOLOGY

## 2024-06-18 PROCEDURE — 82565 ASSAY OF CREATININE: CPT | Performed by: OBSTETRICS & GYNECOLOGY

## 2024-06-18 PROCEDURE — 84550 ASSAY OF BLOOD/URIC ACID: CPT | Performed by: OBSTETRICS & GYNECOLOGY

## 2024-06-18 PROCEDURE — 84075 ASSAY ALKALINE PHOSPHATASE: CPT | Performed by: OBSTETRICS & GYNECOLOGY

## 2024-06-18 PROCEDURE — 36415 COLL VENOUS BLD VENIPUNCTURE: CPT | Performed by: OBSTETRICS & GYNECOLOGY

## 2024-06-18 PROCEDURE — G0463 HOSPITAL OUTPT CLINIC VISIT: HCPCS

## 2024-06-18 PROCEDURE — 85027 COMPLETE CBC AUTOMATED: CPT | Performed by: OBSTETRICS & GYNECOLOGY

## 2024-06-18 PROCEDURE — 59025 FETAL NON-STRESS TEST: CPT | Performed by: OBSTETRICS & GYNECOLOGY

## 2024-06-18 RX ORDER — LABETALOL 200 MG/1
300 TABLET, FILM COATED ORAL EVERY 8 HOURS
Qty: 90 TABLET | Refills: 1 | Status: SHIPPED | OUTPATIENT
Start: 2024-06-18 | End: 2024-06-21

## 2024-06-18 NOTE — PROGRESS NOTES
OB FOLLOW UP  CC- Here for care of pregnancy        Darlene Lira is a 38 y.o.  34w0d patient being seen today for her obstetrical follow up visit. Patient reports no complaints. Pt feels well, good fm, no ctx, no c/o. BS WNL      Her prenatal care is complicated by (and status) : see below.  Patient Active Problem List   Diagnosis    Essential hypertension    Class 3 severe obesity with serious comorbidity and body mass index (BMI) of 40.0 to 44.9 in adult    Tobacco abuse    Pregnancy    Twin pregnancy, dichorionic/diamniotic, unspecified trimester    History of loop electrical excision procedure (LEEP)    Insulin controlled gestational diabetes mellitus (GDM) during pregnancy, antepartum    Antepartum multigravida of advanced maternal age    Family history of VSD (ventricular septal defect)    Rh negative, antepartum    IUGR (intrauterine growth restriction) affecting care of mother, second trimester, fetus 1       Flu Status: Already given in current flu season  Ultrasound Today: No  Non Stress Test: Yes minutes 20    ROS -   Patient Denies: Loss of Fluid, Vaginal Spotting, Vision Changes, Headaches, Nausea , Vomiting , Contractions, Epigastric pain, and skin itching  Fetal Movement : normal  All other systems reviewed and are negative.       The additional following portions of the patient's history were reviewed and updated as appropriate: allergies, current medications, past family history, past medical history, past social history, past surgical history, and problem list.    I have reviewed and agree with the HPI, ROS, and historical information as entered above. Morena Penn MD      /68   Wt 126 kg (278 lb)   LMP 2023   BMI 49.25 kg/m²       EXAM:     Prenatal Vitals  BP: 140/68  Weight: 126 kg (278 lb)   Fetal Heart Rate: NST            Non Stress Test: minutes 20  non-stress test:  difficulty keeping both babies on monitor  indication: Multiple Gestation                  Assessment and Plan    Problem List Items Addressed This Visit          Gynecologic and Obstetric Problems    Insulin controlled gestational diabetes mellitus (GDM) during pregnancy, antepartum - Primary    Overview     On insulin with G1 and G2  Seeing Dr Cleveland, started FSBS monitoring 12 wks- started insulin.         Relevant Medications    Insulin Glargine (LANTUS SOLOSTAR) 100 UNIT/ML injection pen    Other Relevant Orders    US fetal biophysical no stress ea add gest    US Fetal Biophysical Profile No Stress       Other    Essential hypertension (Chronic)    Overview     Labetelol 200 BID at nob  Baseline PEP, 24h urine  Baby asa         Relevant Medications    labetalol (NORMODYNE) 200 MG tablet    Other Relevant Orders    US fetal biophysical no stress ea add gest    US Fetal Biophysical Profile No Stress    Pregnancy    Overview     2 prev  term  Preeclampsia with G1 at term         Relevant Orders    US fetal biophysical no stress ea add gest    US Fetal Biophysical Profile No Stress    Twin pregnancy, dichorionic/diamniotic, unspecified trimester    Antepartum multigravida of advanced maternal age    Relevant Orders    US fetal biophysical no stress ea add gest    US Fetal Biophysical Profile No Stress    Rh negative, antepartum    IUGR (intrauterine growth restriction) affecting care of mother, second trimester, fetus 1    Overview     AC 8%ile on baby A 32 wks.          Relevant Orders    US fetal biophysical no stress ea add gest    US Fetal Biophysical Profile No Stress     Other Visit Diagnoses       Non-reactive NST (non-stress test)        Relevant Orders    US fetal biophysical no stress ea add gest    US Fetal Biophysical Profile No Stress            Pregnancy at 34w0d.  BPP 6/8 on baby B. Given unable to obtain reactive nst here in office, will send to triage for extended monitoring. If 8/10 bpp, ok to FU in office on Friday as sched.   FSBS well controlled.   BPs mild range on labetelol  200 TID. Will increase to 300 TID.   Fetal status reassuring.   Activity and Exercise discussed.  Fetal movement/PTL or Labor precautions  Return in about 3 days (around 6/21/2024) for F/U Prenatal, NST Next Visit.    Morena Penn MD  06/18/2024
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
## 2024-06-19 ENCOUNTER — TELEPHONE (OUTPATIENT)
Dept: OBSTETRICS AND GYNECOLOGY | Facility: HOSPITAL | Age: 39
End: 2024-06-19
Payer: COMMERCIAL
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
## 2024-06-19 NOTE — TELEPHONE ENCOUNTER
PT STATED SHE WAS TOLD BY [facility] TO GET IN SOONER THAN 6-27 /PLAYFORTH SHE STATED BABIES LOOK GREAT NO REASON TO COME IN SOONER UNLESS PT FEELS UNCOMFORTABLE W EVERY THING THAT HAPPEN YESTERDAY OR FEEL SIGN OF PRE CLAMPSIA OR NOT FEELING BABIES MOVE     PT SAID SHE WAS IN OB OFFICE FOR 6 HOURS THEN SENT TO L/D FOR EVALUATION AND DISCHARGE AT 10:15PM PT STATED TOTAL CHAOS THE WHOLE DAY.     PT SEEING HER OB ON FRIDAY AN DIF THEY FEEL WE NEED TO SEE HER WE WILL GET PT IN OR IF PT FEELS ANY DIFFER THAN TODAY WE WILL GET HER IN SOONER

## 2024-06-21 ENCOUNTER — ROUTINE PRENATAL (OUTPATIENT)
Dept: OBSTETRICS AND GYNECOLOGY | Facility: CLINIC | Age: 39
End: 2024-06-21
Payer: COMMERCIAL

## 2024-06-21 VITALS — SYSTOLIC BLOOD PRESSURE: 148 MMHG | DIASTOLIC BLOOD PRESSURE: 62 MMHG | WEIGHT: 281.4 LBS | BODY MASS INDEX: 49.85 KG/M2

## 2024-06-21 DIAGNOSIS — E66.01 CLASS 3 SEVERE OBESITY WITH SERIOUS COMORBIDITY AND BODY MASS INDEX (BMI) OF 40.0 TO 44.9 IN ADULT, UNSPECIFIED OBESITY TYPE: Chronic | ICD-10-CM

## 2024-06-21 DIAGNOSIS — O24.414 INSULIN CONTROLLED GESTATIONAL DIABETES MELLITUS (GDM) DURING PREGNANCY, ANTEPARTUM: Primary | ICD-10-CM

## 2024-06-21 DIAGNOSIS — Z34.90 PRENATAL CARE, ANTEPARTUM: ICD-10-CM

## 2024-06-21 DIAGNOSIS — O09.529 ANTEPARTUM MULTIGRAVIDA OF ADVANCED MATERNAL AGE: ICD-10-CM

## 2024-06-21 DIAGNOSIS — O30.049 TWIN PREGNANCY, DICHORIONIC/DIAMNIOTIC, UNSPECIFIED TRIMESTER: ICD-10-CM

## 2024-06-21 DIAGNOSIS — I10 ESSENTIAL HYPERTENSION: ICD-10-CM

## 2024-06-21 LAB
GLUCOSE UR STRIP-MCNC: NEGATIVE MG/DL
PROT UR STRIP-MCNC: NEGATIVE MG/DL

## 2024-06-21 RX ORDER — LABETALOL 300 MG/1
300 TABLET, FILM COATED ORAL EVERY 8 HOURS
Qty: 90 TABLET | Refills: 0 | Status: SHIPPED | OUTPATIENT
Start: 2024-06-21

## 2024-06-21 NOTE — PROGRESS NOTES
OB FOLLOW UP  CC- Here for care of pregnancy        Darlene Lira is a 38 y.o.  34w3d patient being seen today for her obstetrical follow up visit. Patient reports no complaints.     Her prenatal care is complicated by (and status) : Chronic HTN. Pt does not monitor her BP at home. She is taking Labetalol 200mg TID. States she was told to increase her labetalol to 300mg TID but is almost out of tablets and needs a new Rx if she needs to increase the dose.     GDM insulin controlled. Her fasting BG range has averaged <95. Her 2hr PP  has averaged <120. States that she had to cancel her endocrinology appt for today.   Patient Active Problem List   Diagnosis    Essential hypertension    Class 3 severe obesity with serious comorbidity and body mass index (BMI) of 40.0 to 44.9 in adult    Tobacco abuse    Pregnancy    Twin pregnancy, dichorionic/diamniotic, unspecified trimester    History of loop electrical excision procedure (LEEP)    Insulin controlled gestational diabetes mellitus (GDM) during pregnancy, antepartum    Antepartum multigravida of advanced maternal age    Family history of VSD (ventricular septal defect)    Rh negative, antepartum    IUGR (intrauterine growth restriction) affecting care of mother, second trimester, fetus 1       Ultrasound Today: No  Non Stress Test: Yes minutes 20+  non-stress test: NST: Reactive  indication: Multiple Gestation, GDM, CHTN  category: Category I      ROS -   Patient Denies: Loss of Fluid, Vaginal Spotting, Vision Changes, Headaches, Nausea , Vomiting , Contractions, Epigastric pain, and skin itching  Fetal Movement : normal  All other systems reviewed and are negative.       The additional following portions of the patient's history were reviewed and updated as appropriate: allergies, current medications, past family history, past medical history, past social history, past surgical history, and problem list.    I have reviewed and agree with the HPI,  ROS, and historical information as entered above. TAHIRA Lopez      /62   Wt 128 kg (281 lb 6.4 oz)   LMP 11/04/2023   BMI 49.85 kg/m²       EXAM:     Prenatal Vitals  BP: 148/62  Weight: 128 kg (281 lb 6.4 oz)   Fetal Heart Rate: NST               Urine Glucose Read-only: Negative  Urine Protein Read-only: Negative           Assessment and Plan    Problem List Items Addressed This Visit       Essential hypertension (Chronic)    Overview     Labetelol 200 BID at nob  Baseline PEP, 24h urine  Baby asa         Relevant Medications    labetalol (NORMODYNE) 300 MG tablet    Class 3 severe obesity with serious comorbidity and body mass index (BMI) of 40.0 to 44.9 in adult (Chronic)    Twin pregnancy, dichorionic/diamniotic, unspecified trimester    Insulin controlled gestational diabetes mellitus (GDM) during pregnancy, antepartum - Primary    Overview     On insulin with G1 and G2  Seeing Dr Cleveland, started FSBS monitoring 12 wks- started insulin.         Relevant Medications    Insulin Glargine (LANTUS SOLOSTAR) 100 UNIT/ML injection pen    Antepartum multigravida of advanced maternal age     Other Visit Diagnoses       Prenatal care, antepartum        Relevant Orders    POC Urinalysis Dipstick (Completed)            Pregnancy at 34w3d  Fetal status reassuring x2.   Rx sent for labetalol 300mg TID, pt states she will start that tonight.   Activity and Exercise discussed.  Fetal movement/PTL or Labor precautions  Reviewed FSBS goals: fasting <90, 2hr PP < 120  Reviewed Pre-eclampsia signs/symptoms  Discussed bASA for PIH prevention from 12 to 36wk  Return for NST twice weekly.    TAHIRA Lopez  06/21/2024

## 2024-06-25 ENCOUNTER — ROUTINE PRENATAL (OUTPATIENT)
Dept: OBSTETRICS AND GYNECOLOGY | Facility: CLINIC | Age: 39
End: 2024-06-25
Payer: COMMERCIAL

## 2024-06-25 VITALS — BODY MASS INDEX: 49.46 KG/M2 | SYSTOLIC BLOOD PRESSURE: 128 MMHG | DIASTOLIC BLOOD PRESSURE: 64 MMHG | WEIGHT: 279.2 LBS

## 2024-06-25 DIAGNOSIS — Z67.91 RH NEGATIVE, ANTEPARTUM: ICD-10-CM

## 2024-06-25 DIAGNOSIS — O30.049 TWIN PREGNANCY, DICHORIONIC/DIAMNIOTIC, UNSPECIFIED TRIMESTER: ICD-10-CM

## 2024-06-25 DIAGNOSIS — Z34.93 PRENATAL CARE IN THIRD TRIMESTER: Primary | ICD-10-CM

## 2024-06-25 DIAGNOSIS — Z3A.35 35 WEEKS GESTATION OF PREGNANCY: ICD-10-CM

## 2024-06-25 DIAGNOSIS — O36.5921 IUGR (INTRAUTERINE GROWTH RESTRICTION) AFFECTING CARE OF MOTHER, SECOND TRIMESTER, FETUS 1: ICD-10-CM

## 2024-06-25 DIAGNOSIS — I10 ESSENTIAL HYPERTENSION: Chronic | ICD-10-CM

## 2024-06-25 DIAGNOSIS — O24.414 INSULIN CONTROLLED GESTATIONAL DIABETES MELLITUS (GDM) DURING PREGNANCY, ANTEPARTUM: ICD-10-CM

## 2024-06-25 DIAGNOSIS — O26.899 RH NEGATIVE, ANTEPARTUM: ICD-10-CM

## 2024-06-25 DIAGNOSIS — O09.529 ANTEPARTUM MULTIGRAVIDA OF ADVANCED MATERNAL AGE: ICD-10-CM

## 2024-06-25 LAB
GLUCOSE UR STRIP-MCNC: NEGATIVE MG/DL
PROT UR STRIP-MCNC: NEGATIVE MG/DL

## 2024-06-25 PROCEDURE — 59025 FETAL NON-STRESS TEST: CPT | Performed by: OBSTETRICS & GYNECOLOGY

## 2024-06-25 PROCEDURE — 0502F SUBSEQUENT PRENATAL CARE: CPT | Performed by: OBSTETRICS & GYNECOLOGY

## 2024-06-25 NOTE — PROGRESS NOTES
OB FOLLOW UP  CC- Here for care of pregnancy        Darlene Lira is a 38 y.o.  35w0d patient being seen today for her obstetrical follow up visit. Patient reports swelling in lower extremities with pitting.     Patient denies checking b/p at home. Her fasting BS has been less than 95, BS after lunch less than 120, and BS after dinner 120-125. She is taking insulin QHS.     Her prenatal care is complicated by (and status) : GDM and CHTN  Patient Active Problem List   Diagnosis    Essential hypertension    Class 3 severe obesity with serious comorbidity and body mass index (BMI) of 40.0 to 44.9 in adult    Tobacco abuse    Pregnancy    Twin pregnancy, dichorionic/diamniotic, unspecified trimester    History of loop electrical excision procedure (LEEP)    Insulin controlled gestational diabetes mellitus (GDM) during pregnancy, antepartum    Antepartum multigravida of advanced maternal age    Family history of VSD (ventricular septal defect)    Rh negative, antepartum    IUGR (intrauterine growth restriction) affecting care of mother, second trimester, fetus 1       Ultrasound Today: No  Non Stress Test: Yes minutes 20  non-stress test: NST: Reactive x2  indication:  GDM and CHTN    ROS -   Patient Denies: Loss of Fluid, Vaginal Spotting, Vision Changes, Headaches, Contractions, Epigastric pain, and skin itching  Fetal Movement : normal  All other systems reviewed and are negative.       The additional following portions of the patient's history were reviewed and updated as appropriate: allergies, current medications, past family history, past medical history, past social history, past surgical history, and problem list.    I have reviewed and agree with the HPI, ROS, and historical information as entered above. Morena Penn MD      /64   Wt 127 kg (279 lb 3.2 oz)   LMP 2023   BMI 49.46 kg/m²       EXAM:     Prenatal Vitals  BP: 128/64  Weight: 127 kg (279 lb 3.2 oz)   Fetal Heart  Rate: NST               Urine Glucose Read-only: Negative  Urine Protein Read-only: Negative           Assessment and Plan    Problem List Items Addressed This Visit          Gynecologic and Obstetric Problems    Insulin controlled gestational diabetes mellitus (GDM) during pregnancy, antepartum    Overview     On insulin with G1 and G2  Seeing Dr Cleveland, started FSBS monitoring 12 wks- started insulin.         Relevant Medications    Insulin Glargine (LANTUS SOLOSTAR) 100 UNIT/ML injection pen       Other    Essential hypertension (Chronic)    Overview     Labetelol 200 BID at nob  Baseline PEP, 24h urine  Baby asa  Increased labetalol to 300 TID         Relevant Medications    labetalol (NORMODYNE) 300 MG tablet    Pregnancy    Overview     2 prev  term  Preeclampsia with G1 at term         Twin pregnancy, dichorionic/diamniotic, unspecified trimester    Antepartum multigravida of advanced maternal age    Rh negative, antepartum    IUGR (intrauterine growth restriction) affecting care of mother, second trimester, fetus 1    Overview     AC 8%ile on baby A 32 wks.           Other Visit Diagnoses       Prenatal care in third trimester    -  Primary    Relevant Orders    POC Urinalysis Dipstick (Completed)            Pregnancy at 35w0d. NST reactive x2. Has PDC FU 2 days and will make delivery plan after this.   Fetal status reassuring.   Activity and Exercise discussed.  Fetal movement/PTL or Labor precautions  Return in about 1 week (around 2024) for F/U Prenatal, NST Next Visit.    Morena Penn MD  2024

## 2024-06-27 ENCOUNTER — HOSPITAL ENCOUNTER (OUTPATIENT)
Dept: WOMENS IMAGING | Facility: HOSPITAL | Age: 39
Discharge: HOME | End: 2024-06-27
Admitting: OBSTETRICS & GYNECOLOGY
Payer: COMMERCIAL

## 2024-06-27 ENCOUNTER — HOSPITAL ENCOUNTER (OUTPATIENT)
Facility: HOSPITAL | Age: 39
Setting detail: OBSERVATION
Discharge: HOME | End: 2024-06-27
Attending: OBSTETRICS & GYNECOLOGY | Admitting: OBSTETRICS & GYNECOLOGY
Payer: COMMERCIAL

## 2024-06-27 ENCOUNTER — OFFICE VISIT (OUTPATIENT)
Dept: OBSTETRICS AND GYNECOLOGY | Facility: HOSPITAL | Age: 39
End: 2024-06-27
Payer: COMMERCIAL

## 2024-06-27 VITALS — SYSTOLIC BLOOD PRESSURE: 126 MMHG | DIASTOLIC BLOOD PRESSURE: 61 MMHG

## 2024-06-27 DIAGNOSIS — O36.5921 IUGR (INTRAUTERINE GROWTH RESTRICTION) AFFECTING CARE OF MOTHER, SECOND TRIMESTER, FETUS 1: Primary | ICD-10-CM

## 2024-06-27 DIAGNOSIS — E66.01 CLASS 3 SEVERE OBESITY WITH SERIOUS COMORBIDITY AND BODY MASS INDEX (BMI) OF 40.0 TO 44.9 IN ADULT, UNSPECIFIED OBESITY TYPE: Chronic | ICD-10-CM

## 2024-06-27 DIAGNOSIS — O30.049 TWIN PREGNANCY, DICHORIONIC/DIAMNIOTIC, UNSPECIFIED TRIMESTER: ICD-10-CM

## 2024-06-27 DIAGNOSIS — O24.414 INSULIN CONTROLLED GESTATIONAL DIABETES MELLITUS (GDM) DURING PREGNANCY, ANTEPARTUM: ICD-10-CM

## 2024-06-27 DIAGNOSIS — I10 ESSENTIAL HYPERTENSION: Chronic | ICD-10-CM

## 2024-06-27 DIAGNOSIS — O09.522 MULTIGRAVIDA OF ADVANCED MATERNAL AGE IN SECOND TRIMESTER: ICD-10-CM

## 2024-06-27 DIAGNOSIS — Z98.890 HISTORY OF LOOP ELECTRICAL EXCISION PROCEDURE (LEEP): ICD-10-CM

## 2024-06-27 PROBLEM — O10.919 CHRONIC HYPERTENSION AFFECTING PREGNANCY: Status: ACTIVE | Noted: 2024-06-27

## 2024-06-27 LAB
ABO GROUP BLD: NORMAL
ALP SERPL-CCNC: 138 U/L (ref 39–117)
ALT SERPL W P-5'-P-CCNC: 17 U/L (ref 1–33)
AST SERPL-CCNC: 20 U/L (ref 1–32)
BILIRUB SERPL-MCNC: 0.6 MG/DL (ref 0–1.2)
BLD GP AB SCN SERPL QL: POSITIVE
CREAT SERPL-MCNC: 0.45 MG/DL (ref 0.57–1)
DEPRECATED RDW RBC AUTO: 51.3 FL (ref 37–54)
ERYTHROCYTE [DISTWIDTH] IN BLOOD BY AUTOMATED COUNT: 15.9 % (ref 12.3–15.4)
HCT VFR BLD AUTO: 32 % (ref 34–46.6)
HGB BLD-MCNC: 10.6 G/DL (ref 12–15.9)
LDH SERPL-CCNC: 200 U/L (ref 135–214)
MCH RBC QN AUTO: 29.8 PG (ref 26.6–33)
MCHC RBC AUTO-ENTMCNC: 33.1 G/DL (ref 31.5–35.7)
MCV RBC AUTO: 89.9 FL (ref 79–97)
PLATELET # BLD AUTO: 241 10*3/MM3 (ref 140–450)
PMV BLD AUTO: 9.9 FL (ref 6–12)
RBC # BLD AUTO: 3.56 10*6/MM3 (ref 3.77–5.28)
RESIDUAL RHIG DETECTED: NORMAL
RH BLD: NEGATIVE
T&S EXPIRATION DATE: NORMAL
URATE SERPL-MCNC: 4 MG/DL (ref 2.4–5.7)
WBC NRBC COR # BLD AUTO: 8.27 10*3/MM3 (ref 3.4–10.8)

## 2024-06-27 PROCEDURE — 99231 SBSQ HOSP IP/OBS SF/LOW 25: CPT | Performed by: OBSTETRICS & GYNECOLOGY

## 2024-06-27 PROCEDURE — 76820 UMBILICAL ARTERY ECHO: CPT

## 2024-06-27 PROCEDURE — 59025 FETAL NON-STRESS TEST: CPT

## 2024-06-27 PROCEDURE — 85027 COMPLETE CBC AUTOMATED: CPT | Performed by: OBSTETRICS & GYNECOLOGY

## 2024-06-27 PROCEDURE — 86900 BLOOD TYPING SEROLOGIC ABO: CPT | Performed by: OBSTETRICS & GYNECOLOGY

## 2024-06-27 PROCEDURE — 82565 ASSAY OF CREATININE: CPT | Performed by: OBSTETRICS & GYNECOLOGY

## 2024-06-27 PROCEDURE — 83615 LACTATE (LD) (LDH) ENZYME: CPT | Performed by: OBSTETRICS & GYNECOLOGY

## 2024-06-27 PROCEDURE — 76819 FETAL BIOPHYS PROFIL W/O NST: CPT

## 2024-06-27 PROCEDURE — 86850 RBC ANTIBODY SCREEN: CPT | Performed by: OBSTETRICS & GYNECOLOGY

## 2024-06-27 PROCEDURE — 86901 BLOOD TYPING SEROLOGIC RH(D): CPT | Performed by: OBSTETRICS & GYNECOLOGY

## 2024-06-27 PROCEDURE — 86870 RBC ANTIBODY IDENTIFICATION: CPT | Performed by: OBSTETRICS & GYNECOLOGY

## 2024-06-27 PROCEDURE — 84450 TRANSFERASE (AST) (SGOT): CPT | Performed by: OBSTETRICS & GYNECOLOGY

## 2024-06-27 PROCEDURE — 82247 BILIRUBIN TOTAL: CPT | Performed by: OBSTETRICS & GYNECOLOGY

## 2024-06-27 PROCEDURE — 84550 ASSAY OF BLOOD/URIC ACID: CPT | Performed by: OBSTETRICS & GYNECOLOGY

## 2024-06-27 PROCEDURE — 76816 OB US FOLLOW-UP PER FETUS: CPT

## 2024-06-27 PROCEDURE — 84460 ALANINE AMINO (ALT) (SGPT): CPT | Performed by: OBSTETRICS & GYNECOLOGY

## 2024-06-27 PROCEDURE — 84075 ASSAY ALKALINE PHOSPHATASE: CPT | Performed by: OBSTETRICS & GYNECOLOGY

## 2024-06-27 PROCEDURE — 59025 FETAL NON-STRESS TEST: CPT | Performed by: OBSTETRICS & GYNECOLOGY

## 2024-06-27 PROCEDURE — G0378 HOSPITAL OBSERVATION PER HR: HCPCS

## 2024-06-27 NOTE — PROGRESS NOTES
"Documentation of the ultrasound findings, images, and interpretations will be available in the patient's Viewpoint report which is located in the imaging tab in chart review.    Maternal/Fetal Medicine Follow Up  Note     Name: Darlene Lira    : 1985     MRN: [id number]     Referring Provider: Morena Penn MD    Chief Complaint  No chief complaint on file.    Subjective     History of Present Illness:  Darlene Lira is a 38 y.o.  35w2d who presents today for Twins, IUGR    DAVID: Estimated Date of Delivery: 24     ROS:   As noted in HPI.     Objective     Vital Signs  LMP 2023   Estimated body mass index is 49.46 kg/m² as calculated from the following:    Height as of 24: 160 cm (63\").    Weight as of 24: 127 kg (279 lb 3.2 oz).    Physical Exam    Ultrasound Impression:   See Viewpoint    Assessment and Plan     Darlene Lira is a 38 y.o.  35w2d who presents today for twins, IUGR    Diagnoses and all orders for this visit:    1. IUGR (intrauterine growth restriction) affecting care of mother, second trimester, fetus 1 (Primary)  Assessment & Plan:  Patient returns today for follow-up for diamniotic dichorionic twins.  Pregnancy is complicated by type 2 diabetes on insulin and chronic hypertension on medication.  Previous scan in our office demonstrated AC lag on twin A.    Ultrasound today demonstrates both twins are normally grown, however twin A has an abdominal circumference lag consistent with intrauterine growth restriction.  Twin B is entirely normal grown.  Both twins have normal amniotic fluid about them and normal umbilical artery Dopplers.  BPP is 8 out of 8 for both twins.    Blood pressure the patient in our office is 160/75 today.  Patient was sent to labor and delivery for evaluation of her blood pressure and consideration for delivery.  If her blood pressure is entirely normal on labor and delivery and there were no other signs of " preeclampsia delivery could be delayed until 36 to 37 weeks as long as fetal testing remains reassuring.  We would recommend twice-weekly  testing at Dr. Irvin's office until delivery.      2. Insulin controlled gestational diabetes mellitus (GDM) during pregnancy, antepartum    3. Essential hypertension         Follow Up  No follow-ups on file.    I spent 20 minutes caring for the patient on the day of service. This included: obtaining or reviewing a separately obtained medical history, reviewing patient records, performing a medically appropriate exam and/or evaluation, counseling or educating the patient/family/caregiver, ordering medications, labs, and/or procedures and documenting such in the medical record. This does not include time spent on review and interpretation of other tests such as fetal ultrasound or the performance of other procedures such as amniocentesis or CVS.      Douglas A. Milligan, MD  Maternal Fetal Medicine, [facility]     2024

## 2024-06-27 NOTE — ASSESSMENT & PLAN NOTE
Patient returns today for follow-up for diamniotic dichorionic twins.  Pregnancy is complicated by type 2 diabetes on insulin and chronic hypertension on medication.  Previous scan in our office demonstrated AC lag on twin A.    Ultrasound today demonstrates both twins are normally grown, however twin A has an abdominal circumference lag consistent with intrauterine growth restriction.  Twin B is entirely normal grown.  Both twins have normal amniotic fluid about them and normal umbilical artery Dopplers.  BPP is 8 out of 8 for both twins.    Blood pressure the patient in our office is 160/75 today.  Patient was sent to labor and delivery for evaluation of her blood pressure and consideration for delivery.  If her blood pressure is entirely normal on labor and delivery and there were no other signs of preeclampsia delivery could be delayed until 36 to 37 weeks as long as fetal testing remains reassuring.  We would recommend twice-weekly  testing at Dr. Irvin's office until delivery.
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
## 2024-06-27 NOTE — LETTER
"2024     Morena Penn MD  [address]  [place] [facility] [address]    Patient: Darlene Lira   YOB: 1985   Date of Visit: 2024     Dear Morena Penn MD:       Thank you for referring Darlene Lira to me for evaluation. Below are the relevant portions of my assessment and plan of care.    If you have questions, please do not hesitate to call me. I look forward to following Darlene along with you.         Sincerely,        Douglas A. Milligan, MD        CC: No Recipients    Milligan, Douglas A, MD  24 1556  Sign when Signing Visit  Documentation of the ultrasound findings, images, and interpretations will be available in the patient's Viewpoint report which is located in the imaging tab in chart review.    Maternal/Fetal Medicine Follow Up  Note     Name: Darlene Lira    : 1985     MRN: [id number]     Referring Provider: Morena Penn MD    Chief Complaint  No chief complaint on file.    Subjective     History of Present Illness:  Darlene Lira is a 38 y.o.  35w2d who presents today for Twins, IUGR    DAVID: Estimated Date of Delivery: 24     ROS:   As noted in HPI.     Objective     Vital Signs  LMP 2023   Estimated body mass index is 49.46 kg/m² as calculated from the following:    Height as of 24: 160 cm (63\").    Weight as of 24: 127 kg (279 lb 3.2 oz).    Physical Exam    Ultrasound Impression:   See Viewpoint    Assessment and Plan     Darlene Lira is a 38 y.o.  35w2d who presents today for twins, IUGR    Diagnoses and all orders for this visit:    1. IUGR (intrauterine growth restriction) affecting care of mother, second trimester, fetus 1 (Primary)  Assessment & Plan:  Patient returns today for follow-up for diamniotic dichorionic twins.  Pregnancy is complicated by type 2 diabetes on insulin and chronic hypertension on medication.  Previous scan in our office demonstrated AC lag on twin " A.    Ultrasound today demonstrates both twins are normally grown, however twin A has an abdominal circumference lag consistent with intrauterine growth restriction.  Twin B is entirely normal grown.  Both twins have normal amniotic fluid about them and normal umbilical artery Dopplers.  BPP is 8 out of 8 for both twins.    Blood pressure the patient in our office is 160/75 today.  Patient was sent to labor and delivery for evaluation of her blood pressure and consideration for delivery.  If her blood pressure is entirely normal on labor and delivery and there were no other signs of preeclampsia delivery could be delayed until 36 to 37 weeks as long as fetal testing remains reassuring.  We would recommend twice-weekly  testing at Dr. Irvin's office until delivery.      2. Insulin controlled gestational diabetes mellitus (GDM) during pregnancy, antepartum    3. Essential hypertension         Follow Up  No follow-ups on file.    I spent 20 minutes caring for the patient on the day of service. This included: obtaining or reviewing a separately obtained medical history, reviewing patient records, performing a medically appropriate exam and/or evaluation, counseling or educating the patient/family/caregiver, ordering medications, labs, and/or procedures and documenting such in the medical record. This does not include time spent on review and interpretation of other tests such as fetal ultrasound or the performance of other procedures such as amniocentesis or CVS.      Douglas A. Milligan, MD  Maternal Fetal Medicine, [facility]     2024

## 2024-07-02 ENCOUNTER — LAB (OUTPATIENT)
Dept: LAB | Facility: HOSPITAL | Age: 39
End: 2024-07-02
Payer: COMMERCIAL

## 2024-07-02 ENCOUNTER — ROUTINE PRENATAL (OUTPATIENT)
Dept: OBSTETRICS AND GYNECOLOGY | Facility: CLINIC | Age: 39
End: 2024-07-02
Payer: COMMERCIAL

## 2024-07-02 VITALS — SYSTOLIC BLOOD PRESSURE: 152 MMHG | BODY MASS INDEX: 49.46 KG/M2 | DIASTOLIC BLOOD PRESSURE: 58 MMHG | WEIGHT: 279.2 LBS

## 2024-07-02 DIAGNOSIS — O30.049 TWIN PREGNANCY, DICHORIONIC/DIAMNIOTIC, UNSPECIFIED TRIMESTER: ICD-10-CM

## 2024-07-02 DIAGNOSIS — Z67.91 RH NEGATIVE, ANTEPARTUM: ICD-10-CM

## 2024-07-02 DIAGNOSIS — O09.523 AMA (ADVANCED MATERNAL AGE) MULTIGRAVIDA 35+, THIRD TRIMESTER: ICD-10-CM

## 2024-07-02 DIAGNOSIS — O24.414 INSULIN CONTROLLED GESTATIONAL DIABETES MELLITUS (GDM) DURING PREGNANCY, ANTEPARTUM: ICD-10-CM

## 2024-07-02 DIAGNOSIS — O36.5921 IUGR (INTRAUTERINE GROWTH RESTRICTION) AFFECTING CARE OF MOTHER, SECOND TRIMESTER, FETUS 1: ICD-10-CM

## 2024-07-02 DIAGNOSIS — O09.529 ANTEPARTUM MULTIGRAVIDA OF ADVANCED MATERNAL AGE: ICD-10-CM

## 2024-07-02 DIAGNOSIS — O10.919 CHRONIC HYPERTENSION AFFECTING PREGNANCY: ICD-10-CM

## 2024-07-02 DIAGNOSIS — O26.899 RH NEGATIVE, ANTEPARTUM: ICD-10-CM

## 2024-07-02 DIAGNOSIS — E66.01 CLASS 3 SEVERE OBESITY WITH SERIOUS COMORBIDITY AND BODY MASS INDEX (BMI) OF 40.0 TO 44.9 IN ADULT, UNSPECIFIED OBESITY TYPE: Chronic | ICD-10-CM

## 2024-07-02 DIAGNOSIS — I10 ESSENTIAL HYPERTENSION: Chronic | ICD-10-CM

## 2024-07-02 DIAGNOSIS — Z34.83 MULTIGRAVIDA IN THIRD TRIMESTER: Primary | ICD-10-CM

## 2024-07-02 LAB
GLUCOSE UR STRIP-MCNC: NEGATIVE MG/DL
PROT UR STRIP-MCNC: NEGATIVE MG/DL

## 2024-07-02 PROCEDURE — 87081 CULTURE SCREEN ONLY: CPT | Performed by: NURSE PRACTITIONER

## 2024-07-02 NOTE — PROGRESS NOTES
OB FOLLOW UP  CC- Here for care of pregnancy        Darlene Lira is a 38 y.o.  36w0d patient being seen today for her obstetrical follow up visit. Patient reports no complaints.     Her prenatal care is complicated by (and status) : see below., Chronic HTN. Her average BP has been GDM insulin controlled ., and fs bs was under 95 but averages 88-95 and 2 hr pp was below 120 and average is 109-118.  Patient Active Problem List   Diagnosis    Essential hypertension    Class 3 severe obesity with serious comorbidity and body mass index (BMI) of 40.0 to 44.9 in adult    Tobacco abuse    Pregnancy    Twin pregnancy, dichorionic/diamniotic, unspecified trimester    History of loop electrical excision procedure (LEEP)    Insulin controlled gestational diabetes mellitus (GDM) during pregnancy, antepartum    Antepartum multigravida of advanced maternal age    Family history of VSD (ventricular septal defect)    Rh negative, antepartum    IUGR (intrauterine growth restriction) affecting care of mother, second trimester, fetus 1    Chronic hypertension affecting pregnancy       GBS Status:  done today and not allergic to pcn    No Known Allergies       Flu Status: Declines  Her Delivery Plan is:  wants to discuss with provider     US today: BPP 8/8 x 2  Non Stress Test: Yes for GDM and CHTN >  minutes 20 but unable to keep both twins traced    ROS -   Patient Denies: Loss of Fluid, Vaginal Spotting, Vision Changes, Headaches, Nausea , Vomiting , Contractions, Epigastric pain, and skin itching  Fetal Movement : normal  All other systems reviewed and are negative.       The additional following portions of the patient's history were reviewed and updated as appropriate: allergies, current medications, past family history, past medical history, past social history, past surgical history, and problem list.    I have reviewed and agree with the HPI, ROS, and historical information as entered above. Cortez SHEIKH  Eduardo, APRN        EXAM:     Prenatal Vitals  BP: 152/58  Weight: 127 kg (279 lb 3.2 oz)   Fetal Heart Rate: pos/pos              Urine Glucose Read-only: Negative  Urine Protein Read-only: Negative           Assessment and Plan    Problem List Items Addressed This Visit       Essential hypertension (Chronic)    Overview     Labetelol 200 BID at nob  Baseline PEP, 24h urine  Baby asa  Increased labetalol to 300 TID         Relevant Medications    labetalol (NORMODYNE) 300 MG tablet    Class 3 severe obesity with serious comorbidity and body mass index (BMI) of 40.0 to 44.9 in adult (Chronic)    Relevant Orders    Group B Streptococcus Culture - Swab, Vaginal/Rectum    US Fetal Biophysical Profile;With Non-Stress Testing    US fetal biophysical wo nonstress testing ea add gest    Twin pregnancy, dichorionic/diamniotic, unspecified trimester    Relevant Orders    Group B Streptococcus Culture - Swab, Vaginal/Rectum    US Fetal Biophysical Profile;With Non-Stress Testing    US fetal biophysical wo nonstress testing ea add gest    Insulin controlled gestational diabetes mellitus (GDM) during pregnancy, antepartum    Overview     On insulin with G1 and G2  Seeing Dr Cleveland, started FSBS monitoring 12 wks- started insulin.         Relevant Medications    Insulin Glargine (LANTUS SOLOSTAR) 100 UNIT/ML injection pen    Antepartum multigravida of advanced maternal age    Rh negative, antepartum    IUGR (intrauterine growth restriction) affecting care of mother, second trimester, fetus 1    Overview     AC 8%ile on baby A 32 wks.          Chronic hypertension affecting pregnancy    Relevant Medications    labetalol (NORMODYNE) 300 MG tablet     Other Visit Diagnoses       Multigravida in third trimester    -  Primary    Relevant Orders    POC Urinalysis Dipstick (Completed)    Group B Streptococcus Culture - Swab, Vaginal/Rectum    US Fetal Biophysical Profile;With Non-Stress Testing    US fetal biophysical wo  nonstress testing ea add gest    AMA (advanced maternal age) multigravida 35+, third trimester        Relevant Orders    POC Urinalysis Dipstick (Completed)    Group B Streptococcus Culture - Swab, Vaginal/Rectum    US Fetal Biophysical Profile;With Non-Stress Testing    US fetal biophysical wo nonstress testing ea add gest            Pregnancy at 36w0d  Pt to go home and rest and monitor BP. Mild elevation in the office. Call for BP >/150/100  Fetal status reassuring.   Reviewed Pre-eclampsia signs/symptoms  Reviewed upcoming c/s and PAT instructions with patient. Arrival time and NPO status reviewed.   Delivery options reviewed with patient  Signs of labor reviewed  Kick counts reviewed  Activity and Exercise discussed.  CS scheduled for 7/4/24 at 2 pm. Pt to arrive at noon. Instructions given.  Return in about 2 weeks (around 7/16/2024) for PP.    Cortez Eduardo, APRN  07/02/2024

## 2024-07-04 ENCOUNTER — HOSPITAL ENCOUNTER (INPATIENT)
Facility: HOSPITAL | Age: 39
LOS: 3 days | Discharge: HOME | End: 2024-07-07
Attending: OBSTETRICS & GYNECOLOGY | Admitting: OBSTETRICS & GYNECOLOGY
Payer: COMMERCIAL

## 2024-07-04 ENCOUNTER — ANESTHESIA EVENT (OUTPATIENT)
Dept: LABOR AND DELIVERY | Facility: HOSPITAL | Age: 39
End: 2024-07-04
Payer: COMMERCIAL

## 2024-07-04 ENCOUNTER — ANESTHESIA (OUTPATIENT)
Dept: LABOR AND DELIVERY | Facility: HOSPITAL | Age: 39
End: 2024-07-04
Payer: COMMERCIAL

## 2024-07-04 PROBLEM — O30.003 TWIN GESTATION IN THIRD TRIMESTER: Status: ACTIVE | Noted: 2024-07-04

## 2024-07-04 PROBLEM — O36.5930 IUGR (INTRAUTERINE GROWTH RETARDATION) AFFECTING MOTHER, THIRD TRIMESTER, NOT APPLICABLE OR UNSPECIFIED FETUS: Status: ACTIVE | Noted: 2024-07-04

## 2024-07-04 LAB
ABO GROUP BLD: NORMAL
ABO GROUP BLD: NORMAL
ALP SERPL-CCNC: 121 U/L (ref 39–117)
ALT SERPL W P-5'-P-CCNC: 16 U/L (ref 1–33)
AMPHET+METHAMPHET UR QL: NEGATIVE
AMPHETAMINES UR QL: NEGATIVE
AST SERPL-CCNC: 25 U/L (ref 1–32)
BARBITURATES UR QL SCN: NEGATIVE
BENZODIAZ UR QL SCN: NEGATIVE
BILIRUB SERPL-MCNC: 0.8 MG/DL (ref 0–1.2)
BLD GP AB SCN SERPL QL: POSITIVE
BUPRENORPHINE SERPL-MCNC: NEGATIVE NG/ML
CANNABINOIDS SERPL QL: NEGATIVE
COCAINE UR QL: NEGATIVE
CREAT SERPL-MCNC: 0.55 MG/DL (ref 0.57–1)
DEPRECATED RDW RBC AUTO: 49.6 FL (ref 37–54)
ERYTHROCYTE [DISTWIDTH] IN BLOOD BY AUTOMATED COUNT: 15.6 % (ref 12.3–15.4)
FENTANYL UR-MCNC: NEGATIVE NG/ML
FETAL BLEED: NEGATIVE
GLUCOSE BLDC GLUCOMTR-MCNC: 73 MG/DL (ref 70–130)
HCT VFR BLD AUTO: 31.3 % (ref 34–46.6)
HGB BLD-MCNC: 10.7 G/DL (ref 12–15.9)
LDH SERPL-CCNC: 192 U/L (ref 135–214)
MCH RBC QN AUTO: 30.1 PG (ref 26.6–33)
MCHC RBC AUTO-ENTMCNC: 34.2 G/DL (ref 31.5–35.7)
MCV RBC AUTO: 88.2 FL (ref 79–97)
METHADONE UR QL SCN: NEGATIVE
NUMBER OF DOSES: NORMAL
OPIATES UR QL: NEGATIVE
OXYCODONE UR QL SCN: NEGATIVE
PCP UR QL SCN: NEGATIVE
PLATELET # BLD AUTO: 246 10*3/MM3 (ref 140–450)
PMV BLD AUTO: 10 FL (ref 6–12)
RBC # BLD AUTO: 3.55 10*6/MM3 (ref 3.77–5.28)
RESIDUAL RHIG DETECTED: NORMAL
RH BLD: NEGATIVE
RH BLD: NEGATIVE
T&S EXPIRATION DATE: NORMAL
TREPONEMA PALLIDUM IGG+IGM AB [PRESENCE] IN SERUM OR PLASMA BY IMMUNOASSAY: NORMAL
TRICYCLICS UR QL SCN: NEGATIVE
URATE SERPL-MCNC: 4.9 MG/DL (ref 2.4–5.7)
WBC NRBC COR # BLD AUTO: 9.31 10*3/MM3 (ref 3.4–10.8)

## 2024-07-04 PROCEDURE — 82565 ASSAY OF CREATININE: CPT | Performed by: OBSTETRICS & GYNECOLOGY

## 2024-07-04 PROCEDURE — 88302 TISSUE EXAM BY PATHOLOGIST: CPT | Performed by: OBSTETRICS & GYNECOLOGY

## 2024-07-04 PROCEDURE — 82247 BILIRUBIN TOTAL: CPT | Performed by: OBSTETRICS & GYNECOLOGY

## 2024-07-04 PROCEDURE — 25010000002 OXYTOCIN PER 10 UNITS: Performed by: ANESTHESIOLOGY

## 2024-07-04 PROCEDURE — 25010000002 ONDANSETRON PER 1 MG: Performed by: ANESTHESIOLOGY

## 2024-07-04 PROCEDURE — 84075 ASSAY ALKALINE PHOSPHATASE: CPT | Performed by: OBSTETRICS & GYNECOLOGY

## 2024-07-04 PROCEDURE — 80307 DRUG TEST PRSMV CHEM ANLYZR: CPT | Performed by: OBSTETRICS & GYNECOLOGY

## 2024-07-04 PROCEDURE — 25010000002 KETOROLAC TROMETHAMINE PER 15 MG: Performed by: OBSTETRICS & GYNECOLOGY

## 2024-07-04 PROCEDURE — 86901 BLOOD TYPING SEROLOGIC RH(D): CPT | Performed by: OBSTETRICS & GYNECOLOGY

## 2024-07-04 PROCEDURE — 86870 RBC ANTIBODY IDENTIFICATION: CPT | Performed by: OBSTETRICS & GYNECOLOGY

## 2024-07-04 PROCEDURE — 59025 FETAL NON-STRESS TEST: CPT

## 2024-07-04 PROCEDURE — 84550 ASSAY OF BLOOD/URIC ACID: CPT | Performed by: OBSTETRICS & GYNECOLOGY

## 2024-07-04 PROCEDURE — 82948 REAGENT STRIP/BLOOD GLUCOSE: CPT

## 2024-07-04 PROCEDURE — 25010000002 METOCLOPRAMIDE PER 10 MG: Performed by: ANESTHESIOLOGY

## 2024-07-04 PROCEDURE — 0UB70ZZ EXCISION OF BILATERAL FALLOPIAN TUBES, OPEN APPROACH: ICD-10-PCS | Performed by: OBSTETRICS & GYNECOLOGY

## 2024-07-04 PROCEDURE — 83615 LACTATE (LD) (LDH) ENZYME: CPT | Performed by: OBSTETRICS & GYNECOLOGY

## 2024-07-04 PROCEDURE — 25010000002 MIDAZOLAM PER 1 MG: Performed by: ANESTHESIOLOGY

## 2024-07-04 PROCEDURE — 25010000002 MORPHINE PER 10 MG: Performed by: ANESTHESIOLOGY

## 2024-07-04 PROCEDURE — 85027 COMPLETE CBC AUTOMATED: CPT | Performed by: OBSTETRICS & GYNECOLOGY

## 2024-07-04 PROCEDURE — 25810000003 LACTATED RINGERS PER 1000 ML: Performed by: OBSTETRICS & GYNECOLOGY

## 2024-07-04 PROCEDURE — 58611 LIGATE OVIDUCT(S) ADD-ON: CPT | Performed by: OBSTETRICS & GYNECOLOGY

## 2024-07-04 PROCEDURE — 86900 BLOOD TYPING SEROLOGIC ABO: CPT | Performed by: OBSTETRICS & GYNECOLOGY

## 2024-07-04 PROCEDURE — 84450 TRANSFERASE (AST) (SGOT): CPT | Performed by: OBSTETRICS & GYNECOLOGY

## 2024-07-04 PROCEDURE — 86780 TREPONEMA PALLIDUM: CPT | Performed by: OBSTETRICS & GYNECOLOGY

## 2024-07-04 PROCEDURE — 25010000002 CEFAZOLIN 3 G RECONSTITUTED SOLUTION 1 EACH VIAL: Performed by: OBSTETRICS & GYNECOLOGY

## 2024-07-04 PROCEDURE — 86850 RBC ANTIBODY SCREEN: CPT | Performed by: OBSTETRICS & GYNECOLOGY

## 2024-07-04 PROCEDURE — S0260 H&P FOR SURGERY: HCPCS | Performed by: OBSTETRICS & GYNECOLOGY

## 2024-07-04 PROCEDURE — 84460 ALANINE AMINO (ALT) (SGPT): CPT | Performed by: OBSTETRICS & GYNECOLOGY

## 2024-07-04 PROCEDURE — 25010000002 FENTANYL CITRATE (PF) 100 MCG/2ML SOLUTION: Performed by: ANESTHESIOLOGY

## 2024-07-04 PROCEDURE — 59510 CESAREAN DELIVERY: CPT | Performed by: OBSTETRICS & GYNECOLOGY

## 2024-07-04 PROCEDURE — 25010000002 BUPIVACAINE IN DEXTROSE 0.75-8.25 % SOLUTION: Performed by: ANESTHESIOLOGY

## 2024-07-04 PROCEDURE — 88307 TISSUE EXAM BY PATHOLOGIST: CPT | Performed by: OBSTETRICS & GYNECOLOGY

## 2024-07-04 PROCEDURE — 94799 UNLISTED PULMONARY SVC/PX: CPT

## 2024-07-04 PROCEDURE — 85461 HEMOGLOBIN FETAL: CPT | Performed by: OBSTETRICS & GYNECOLOGY

## 2024-07-04 DEVICE — SEAL HEMO SURG ARISTA/AH ABS/PWDR 3GM: Type: IMPLANTABLE DEVICE | Site: ABDOMEN | Status: FUNCTIONAL

## 2024-07-04 RX ORDER — ENOXAPARIN SODIUM 100 MG/ML
40 INJECTION SUBCUTANEOUS EVERY 12 HOURS
Status: DISCONTINUED | OUTPATIENT
Start: 2024-07-05 | End: 2024-07-07 | Stop reason: HOSPADM

## 2024-07-04 RX ORDER — DOCUSATE SODIUM 100 MG/1
100 CAPSULE, LIQUID FILLED ORAL 2 TIMES DAILY PRN
Status: DISCONTINUED | OUTPATIENT
Start: 2024-07-04 | End: 2024-07-07 | Stop reason: HOSPADM

## 2024-07-04 RX ORDER — BUPIVACAINE HYDROCHLORIDE 7.5 MG/ML
INJECTION, SOLUTION INTRASPINAL
Status: COMPLETED | OUTPATIENT
Start: 2024-07-04 | End: 2024-07-04

## 2024-07-04 RX ORDER — FENTANYL CITRATE 50 UG/ML
INJECTION, SOLUTION INTRAMUSCULAR; INTRAVENOUS AS NEEDED
Status: DISCONTINUED | OUTPATIENT
Start: 2024-07-04 | End: 2024-07-04 | Stop reason: SURG

## 2024-07-04 RX ORDER — ACETAMINOPHEN 500 MG
1000 TABLET ORAL EVERY 6 HOURS
Status: COMPLETED | OUTPATIENT
Start: 2024-07-04 | End: 2024-07-05

## 2024-07-04 RX ORDER — NALOXONE HCL 0.4 MG/ML
0.4 VIAL (ML) INJECTION
Status: CANCELLED | OUTPATIENT
Start: 2024-07-04 | End: 2024-07-05

## 2024-07-04 RX ORDER — SODIUM CHLORIDE, SODIUM LACTATE, POTASSIUM CHLORIDE, CALCIUM CHLORIDE 600; 310; 30; 20 MG/100ML; MG/100ML; MG/100ML; MG/100ML
125 INJECTION, SOLUTION INTRAVENOUS CONTINUOUS
Status: DISCONTINUED | OUTPATIENT
Start: 2024-07-04 | End: 2024-07-07 | Stop reason: HOSPADM

## 2024-07-04 RX ORDER — ACETAMINOPHEN 500 MG
1000 TABLET ORAL ONCE
Status: COMPLETED | OUTPATIENT
Start: 2024-07-04 | End: 2024-07-04

## 2024-07-04 RX ORDER — CARBOPROST TROMETHAMINE 250 UG/ML
250 INJECTION, SOLUTION INTRAMUSCULAR AS NEEDED
Status: DISCONTINUED | OUTPATIENT
Start: 2024-07-04 | End: 2024-07-07 | Stop reason: HOSPADM

## 2024-07-04 RX ORDER — SODIUM CHLORIDE 9 MG/ML
40 INJECTION, SOLUTION INTRAVENOUS AS NEEDED
Status: DISCONTINUED | OUTPATIENT
Start: 2024-07-04 | End: 2024-07-04 | Stop reason: HOSPADM

## 2024-07-04 RX ORDER — KETOROLAC TROMETHAMINE 15 MG/ML
15 INJECTION, SOLUTION INTRAMUSCULAR; INTRAVENOUS EVERY 6 HOURS
Status: COMPLETED | OUTPATIENT
Start: 2024-07-05 | End: 2024-07-05

## 2024-07-04 RX ORDER — CALCIUM CARBONATE 500 MG/1
1 TABLET, CHEWABLE ORAL EVERY 4 HOURS PRN
Status: DISCONTINUED | OUTPATIENT
Start: 2024-07-04 | End: 2024-07-07 | Stop reason: HOSPADM

## 2024-07-04 RX ORDER — OXYTOCIN/0.9 % SODIUM CHLORIDE 30/500 ML
125 PLASTIC BAG, INJECTION (ML) INTRAVENOUS ONCE AS NEEDED
Status: DISCONTINUED | OUTPATIENT
Start: 2024-07-04 | End: 2024-07-07 | Stop reason: HOSPADM

## 2024-07-04 RX ORDER — CARBOPROST TROMETHAMINE 250 UG/ML
INJECTION, SOLUTION INTRAMUSCULAR AS NEEDED
Status: DISCONTINUED | OUTPATIENT
Start: 2024-07-04 | End: 2024-07-04 | Stop reason: SURG

## 2024-07-04 RX ORDER — DIPHENHYDRAMINE HYDROCHLORIDE 50 MG/ML
25 INJECTION INTRAMUSCULAR; INTRAVENOUS ONCE AS NEEDED
Status: DISCONTINUED | OUTPATIENT
Start: 2024-07-04 | End: 2024-07-07 | Stop reason: HOSPADM

## 2024-07-04 RX ORDER — TRANEXAMIC ACID 10 MG/ML
INJECTION, SOLUTION INTRAVENOUS AS NEEDED
Status: DISCONTINUED | OUTPATIENT
Start: 2024-07-04 | End: 2024-07-04 | Stop reason: SURG

## 2024-07-04 RX ORDER — SODIUM CHLORIDE 0.9 % (FLUSH) 0.9 %
10 SYRINGE (ML) INJECTION AS NEEDED
Status: DISCONTINUED | OUTPATIENT
Start: 2024-07-04 | End: 2024-07-04 | Stop reason: HOSPADM

## 2024-07-04 RX ORDER — OXYTOCIN/0.9 % SODIUM CHLORIDE 30/500 ML
650 PLASTIC BAG, INJECTION (ML) INTRAVENOUS ONCE
Status: DISCONTINUED | OUTPATIENT
Start: 2024-07-04 | End: 2024-07-04 | Stop reason: HOSPADM

## 2024-07-04 RX ORDER — HYDROCORTISONE 25 MG/G
1 CREAM TOPICAL AS NEEDED
Status: DISCONTINUED | OUTPATIENT
Start: 2024-07-04 | End: 2024-07-07 | Stop reason: HOSPADM

## 2024-07-04 RX ORDER — MIDAZOLAM HYDROCHLORIDE 1 MG/ML
INJECTION INTRAMUSCULAR; INTRAVENOUS AS NEEDED
Status: DISCONTINUED | OUTPATIENT
Start: 2024-07-04 | End: 2024-07-04 | Stop reason: SURG

## 2024-07-04 RX ORDER — ONDANSETRON 2 MG/ML
4 INJECTION INTRAMUSCULAR; INTRAVENOUS ONCE AS NEEDED
Status: CANCELLED | OUTPATIENT
Start: 2024-07-04 | End: 2024-07-05

## 2024-07-04 RX ORDER — LABETALOL 200 MG/1
200 TABLET, FILM COATED ORAL EVERY 8 HOURS
Status: CANCELLED | OUTPATIENT
Start: 2024-07-04

## 2024-07-04 RX ORDER — SIMETHICONE 80 MG
80 TABLET,CHEWABLE ORAL 4 TIMES DAILY PRN
Status: DISCONTINUED | OUTPATIENT
Start: 2024-07-04 | End: 2024-07-07 | Stop reason: HOSPADM

## 2024-07-04 RX ORDER — METOCLOPRAMIDE 10 MG/1
10 TABLET ORAL AS NEEDED
Status: DISCONTINUED | OUTPATIENT
Start: 2024-07-04 | End: 2024-07-07 | Stop reason: HOSPADM

## 2024-07-04 RX ORDER — ACETAMINOPHEN 325 MG/1
650 TABLET ORAL EVERY 6 HOURS
Status: DISCONTINUED | OUTPATIENT
Start: 2024-07-05 | End: 2024-07-07 | Stop reason: HOSPADM

## 2024-07-04 RX ORDER — ONDANSETRON 2 MG/ML
INJECTION INTRAMUSCULAR; INTRAVENOUS AS NEEDED
Status: DISCONTINUED | OUTPATIENT
Start: 2024-07-04 | End: 2024-07-04 | Stop reason: SURG

## 2024-07-04 RX ORDER — METHYLERGONOVINE MALEATE 0.2 MG/ML
200 INJECTION INTRAVENOUS ONCE AS NEEDED
Status: DISCONTINUED | OUTPATIENT
Start: 2024-07-04 | End: 2024-07-04 | Stop reason: HOSPADM

## 2024-07-04 RX ORDER — IBUPROFEN 600 MG/1
600 TABLET ORAL EVERY 6 HOURS
Status: DISCONTINUED | OUTPATIENT
Start: 2024-07-06 | End: 2024-07-07 | Stop reason: HOSPADM

## 2024-07-04 RX ORDER — FAMOTIDINE 10 MG/ML
INJECTION, SOLUTION INTRAVENOUS AS NEEDED
Status: DISCONTINUED | OUTPATIENT
Start: 2024-07-04 | End: 2024-07-04 | Stop reason: SURG

## 2024-07-04 RX ORDER — OXYCODONE HYDROCHLORIDE 5 MG/1
5 TABLET ORAL EVERY 4 HOURS PRN
Status: DISCONTINUED | OUTPATIENT
Start: 2024-07-04 | End: 2024-07-07 | Stop reason: HOSPADM

## 2024-07-04 RX ORDER — PRENATAL VIT/IRON FUM/FOLIC AC 27MG-0.8MG
1 TABLET ORAL DAILY
Status: DISCONTINUED | OUTPATIENT
Start: 2024-07-04 | End: 2024-07-07 | Stop reason: HOSPADM

## 2024-07-04 RX ORDER — ALUMINA, MAGNESIA, AND SIMETHICONE 2400; 2400; 240 MG/30ML; MG/30ML; MG/30ML
15 SUSPENSION ORAL EVERY 4 HOURS PRN
Status: DISCONTINUED | OUTPATIENT
Start: 2024-07-04 | End: 2024-07-07 | Stop reason: HOSPADM

## 2024-07-04 RX ORDER — OXYTOCIN/0.9 % SODIUM CHLORIDE 30/500 ML
85 PLASTIC BAG, INJECTION (ML) INTRAVENOUS ONCE
Status: DISCONTINUED | OUTPATIENT
Start: 2024-07-04 | End: 2024-07-04 | Stop reason: HOSPADM

## 2024-07-04 RX ORDER — EPHEDRINE SULFATE 5 MG/ML
INJECTION INTRAVENOUS AS NEEDED
Status: DISCONTINUED | OUTPATIENT
Start: 2024-07-04 | End: 2024-07-04 | Stop reason: SURG

## 2024-07-04 RX ORDER — OXYTOCIN 10 [USP'U]/ML
INJECTION, SOLUTION INTRAMUSCULAR; INTRAVENOUS AS NEEDED
Status: DISCONTINUED | OUTPATIENT
Start: 2024-07-04 | End: 2024-07-04 | Stop reason: SURG

## 2024-07-04 RX ORDER — CITRIC ACID/SODIUM CITRATE 334-500MG
30 SOLUTION, ORAL ORAL ONCE
Status: COMPLETED | OUTPATIENT
Start: 2024-07-04 | End: 2024-07-04

## 2024-07-04 RX ORDER — KETOROLAC TROMETHAMINE 30 MG/ML
30 INJECTION, SOLUTION INTRAMUSCULAR; INTRAVENOUS ONCE
Status: COMPLETED | OUTPATIENT
Start: 2024-07-04 | End: 2024-07-04

## 2024-07-04 RX ORDER — CARBOPROST TROMETHAMINE 250 UG/ML
250 INJECTION, SOLUTION INTRAMUSCULAR
Status: DISCONTINUED | OUTPATIENT
Start: 2024-07-04 | End: 2024-07-04 | Stop reason: HOSPADM

## 2024-07-04 RX ORDER — OXYTOCIN/0.9 % SODIUM CHLORIDE 30/500 ML
PLASTIC BAG, INJECTION (ML) INTRAVENOUS AS NEEDED
Status: DISCONTINUED | OUTPATIENT
Start: 2024-07-04 | End: 2024-07-04 | Stop reason: SURG

## 2024-07-04 RX ORDER — SODIUM CHLORIDE 0.9 % (FLUSH) 0.9 %
10 SYRINGE (ML) INJECTION EVERY 12 HOURS SCHEDULED
Status: DISCONTINUED | OUTPATIENT
Start: 2024-07-04 | End: 2024-07-04 | Stop reason: HOSPADM

## 2024-07-04 RX ORDER — METHYLERGONOVINE MALEATE 0.2 MG/ML
200 INJECTION INTRAVENOUS ONCE AS NEEDED
Status: DISCONTINUED | OUTPATIENT
Start: 2024-07-04 | End: 2024-07-07 | Stop reason: HOSPADM

## 2024-07-04 RX ORDER — OXYCODONE HYDROCHLORIDE 10 MG/1
10 TABLET ORAL EVERY 4 HOURS PRN
Status: DISCONTINUED | OUTPATIENT
Start: 2024-07-04 | End: 2024-07-07 | Stop reason: HOSPADM

## 2024-07-04 RX ORDER — MISOPROSTOL 200 UG/1
600 TABLET ORAL AS NEEDED
Status: DISCONTINUED | OUTPATIENT
Start: 2024-07-04 | End: 2024-07-07 | Stop reason: HOSPADM

## 2024-07-04 RX ORDER — LIDOCAINE HYDROCHLORIDE 10 MG/ML
0.5 INJECTION, SOLUTION EPIDURAL; INFILTRATION; INTRACAUDAL; PERINEURAL ONCE AS NEEDED
Status: DISCONTINUED | OUTPATIENT
Start: 2024-07-04 | End: 2024-07-04 | Stop reason: HOSPADM

## 2024-07-04 RX ORDER — METOCLOPRAMIDE HYDROCHLORIDE 5 MG/ML
INJECTION INTRAMUSCULAR; INTRAVENOUS AS NEEDED
Status: DISCONTINUED | OUTPATIENT
Start: 2024-07-04 | End: 2024-07-04 | Stop reason: SURG

## 2024-07-04 RX ORDER — MORPHINE SULFATE 0.5 MG/ML
INJECTION, SOLUTION EPIDURAL; INTRATHECAL; INTRAVENOUS AS NEEDED
Status: DISCONTINUED | OUTPATIENT
Start: 2024-07-04 | End: 2024-07-04 | Stop reason: SURG

## 2024-07-04 RX ORDER — DIPHENHYDRAMINE HCL 25 MG
25 CAPSULE ORAL EVERY 4 HOURS PRN
Status: DISCONTINUED | OUTPATIENT
Start: 2024-07-04 | End: 2024-07-07 | Stop reason: HOSPADM

## 2024-07-04 RX ORDER — DIPHENHYDRAMINE HYDROCHLORIDE 50 MG/ML
25 INJECTION INTRAMUSCULAR; INTRAVENOUS EVERY 4 HOURS PRN
Status: DISCONTINUED | OUTPATIENT
Start: 2024-07-04 | End: 2024-07-07 | Stop reason: HOSPADM

## 2024-07-04 RX ORDER — MISOPROSTOL 200 UG/1
800 TABLET ORAL ONCE AS NEEDED
Status: DISCONTINUED | OUTPATIENT
Start: 2024-07-04 | End: 2024-07-04 | Stop reason: HOSPADM

## 2024-07-04 RX ADMIN — FAMOTIDINE 20 MG: 10 INJECTION, SOLUTION INTRAVENOUS at 15:12

## 2024-07-04 RX ADMIN — MIDAZOLAM HYDROCHLORIDE 1 MG: 1 INJECTION, SOLUTION INTRAMUSCULAR; INTRAVENOUS at 15:12

## 2024-07-04 RX ADMIN — ONDANSETRON 4 MG: 2 INJECTION INTRAMUSCULAR; INTRAVENOUS at 15:12

## 2024-07-04 RX ADMIN — EPHEDRINE SULFATE 20 MG: 5 INJECTION INTRAVENOUS at 15:26

## 2024-07-04 RX ADMIN — FENTANYL CITRATE 20 MCG: 50 INJECTION, SOLUTION INTRAMUSCULAR; INTRAVENOUS at 15:20

## 2024-07-04 RX ADMIN — SODIUM CHLORIDE, POTASSIUM CHLORIDE, SODIUM LACTATE AND CALCIUM CHLORIDE 125 ML/HR: 600; 310; 30; 20 INJECTION, SOLUTION INTRAVENOUS at 13:52

## 2024-07-04 RX ADMIN — OXYTOCIN 4 UNITS: 10 INJECTION, SOLUTION INTRAMUSCULAR; INTRAVENOUS at 15:50

## 2024-07-04 RX ADMIN — EPHEDRINE SULFATE 20 MG: 5 INJECTION INTRAVENOUS at 15:37

## 2024-07-04 RX ADMIN — METOCLOPRAMIDE 10 MG: 5 INJECTION, SOLUTION INTRAMUSCULAR; INTRAVENOUS at 15:12

## 2024-07-04 RX ADMIN — OXYTOCIN 3 UNITS: 10 INJECTION, SOLUTION INTRAMUSCULAR; INTRAVENOUS at 15:41

## 2024-07-04 RX ADMIN — TRANEXAMIC ACID 1000 MG: 10 INJECTION, SOLUTION INTRAVENOUS at 16:27

## 2024-07-04 RX ADMIN — ACETAMINOPHEN 1000 MG: 500 TABLET ORAL at 13:33

## 2024-07-04 RX ADMIN — CARBOPROST TROMETHAMINE 250 MCG: 250 INJECTION, SOLUTION INTRAMUSCULAR at 15:41

## 2024-07-04 RX ADMIN — SODIUM CHLORIDE, POTASSIUM CHLORIDE, SODIUM LACTATE AND CALCIUM CHLORIDE: 600; 310; 30; 20 INJECTION, SOLUTION INTRAVENOUS at 15:13

## 2024-07-04 RX ADMIN — SODIUM CHLORIDE, POTASSIUM CHLORIDE, SODIUM LACTATE AND CALCIUM CHLORIDE 999 ML/HR: 600; 310; 30; 20 INJECTION, SOLUTION INTRAVENOUS at 13:00

## 2024-07-04 RX ADMIN — SODIUM CITRATE AND CITRIC ACID MONOHYDRATE 30 ML: 500; 334 SOLUTION ORAL at 15:09

## 2024-07-04 RX ADMIN — MORPHINE SULFATE 0.15 MG: 0.5 INJECTION, SOLUTION EPIDURAL; INTRATHECAL; INTRAVENOUS at 15:20

## 2024-07-04 RX ADMIN — ACETAMINOPHEN 1000 MG: 500 TABLET ORAL at 20:06

## 2024-07-04 RX ADMIN — Medication 1000 ML: at 15:41

## 2024-07-04 RX ADMIN — BUPIVACAINE HYDROCHLORIDE IN DEXTROSE 1.4 ML: 7.5 INJECTION, SOLUTION SUBARACHNOID at 15:20

## 2024-07-04 RX ADMIN — SODIUM CHLORIDE, POTASSIUM CHLORIDE, SODIUM LACTATE AND CALCIUM CHLORIDE: 600; 310; 30; 20 INJECTION, SOLUTION INTRAVENOUS at 16:15

## 2024-07-04 RX ADMIN — OXYTOCIN 3 UNITS: 10 INJECTION, SOLUTION INTRAMUSCULAR; INTRAVENOUS at 15:44

## 2024-07-04 RX ADMIN — SODIUM CHLORIDE 3000 MG: 900 INJECTION INTRAVENOUS at 15:09

## 2024-07-04 RX ADMIN — Medication 500 ML: at 16:28

## 2024-07-04 RX ADMIN — MIDAZOLAM HYDROCHLORIDE 1 MG: 1 INJECTION, SOLUTION INTRAMUSCULAR; INTRAVENOUS at 16:29

## 2024-07-04 RX ADMIN — SODIUM CHLORIDE, POTASSIUM CHLORIDE, SODIUM LACTATE AND CALCIUM CHLORIDE 125 ML/HR: 600; 310; 30; 20 INJECTION, SOLUTION INTRAVENOUS at 18:49

## 2024-07-04 RX ADMIN — MIDAZOLAM HYDROCHLORIDE 1 MG: 1 INJECTION, SOLUTION INTRAMUSCULAR; INTRAVENOUS at 15:52

## 2024-07-04 RX ADMIN — KETOROLAC TROMETHAMINE 30 MG: 30 INJECTION, SOLUTION INTRAMUSCULAR; INTRAVENOUS at 17:42

## 2024-07-04 NOTE — ANESTHESIA PREPROCEDURE EVALUATION
Anesthesia Evaluation     Patient summary reviewed and Nursing notes reviewed   NPO Solid Status: > 8 hours  NPO Liquid Status: > 2 hours           Airway   Mallampati: II  TM distance: >3 FB  Neck ROM: full  Possible difficult intubation  Dental - normal exam     Pulmonary    (+) a smoker,  Cardiovascular     (+) hypertension (chronic)      Neuro/Psych- negative ROS  GI/Hepatic/Renal/Endo    (+) obesity, morbid obesity, GERD poorly controlled, diabetes mellitus gestational using insulin    Musculoskeletal (-) negative ROS    Abdominal   (+) obese   Substance History - negative use     OB/GYN    (+) Pregnant (twins), pregnancy induced hypertension        Other - negative ROS                   Anesthesia Plan    ASA 3     ITN and spinal       Anesthetic plan, risks, benefits, and alternatives have been provided, discussed and informed consent has been obtained with: patient and spouse/significant other.    CODE STATUS:    Level Of Support Discussed With: Patient  Code Status (Patient has no pulse and is not breathing): CPR (Attempt to Resuscitate)  Medical Interventions (Patient has pulse or is breathing): Full Support

## 2024-07-04 NOTE — ANESTHESIA POSTPROCEDURE EVALUATION
Patient: Darlene Lira    Procedure Summary       Date: 24 Room / Location: [facility] LABOR DELIVERY   [facility]OR DELIVERY    Anesthesia Start:  Anesthesia Stop:     Procedure:  SECTION PRIMARY WITH TUBAL (Abdomen) Diagnosis:     Surgeons: Rosalia Foster MD Provider: Carlene Monroe DO    Anesthesia Type: ITN, spinal ASA Status: 3            Anesthesia Type: ITN, spinal    Vitals  Vitals Value Taken Time   /65    Temp 97.5    Pulse 74 24 1657   Resp 15    SpO2 97 % 24 1657   Vitals shown include unfiled device data.        Post Anesthesia Care and Evaluation    Patient location during evaluation: bedside  Patient participation: complete - patient participated  Level of consciousness: awake and awake and alert  Pain score: 0  Pain management: satisfactory to patient    Airway patency: patent  Anesthetic complications: No anesthetic complications  PONV Status: none  Cardiovascular status: acceptable, hemodynamically stable and stable  Respiratory status: acceptable  Hydration status: stable  Post Neuraxial Block status: No signs or symptoms of PDPH

## 2024-07-04 NOTE — ANESTHESIA PROCEDURE NOTES
Spinal Block      Patient reassessed immediately prior to procedure    Patient location during procedure: OB  Indication:procedure for pain  Performed By  Anesthesiologist: Carlene Monroe DO  Preanesthetic Checklist  Completed: patient identified, IV checked, risks and benefits discussed, surgical consent, monitors and equipment checked, pre-op evaluation and timeout performed  Spinal Block Prep:  Patient Position:sitting  Sterile Tech:cap, gloves, mask and sterile barriers  Prep:Chloraprep  Patient Monitoring:blood pressure monitoring and EKG    Spinal Block Procedure  Approach:midline  Guidance:palpation technique  Location:L3-L4  Needle Type:Phil  Needle Gauge:25 G  Placement of Spinal needle event:cerebrospinal fluid aspirated  Paresthesia: no  Fluid Appearance:clear  Medications: bupivacaine in dextrose (MARCAINE SPINAL / Hyberbaric) 0.75-8.25 % injection - Intrathecal   1.4 mL - 7/4/2024 3:20:00 PM   Post Assessment  Patient Tolerance:patient tolerated the procedure well with no apparent complications  Complications no
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
## 2024-07-04 NOTE — OP NOTE
Operative Note    Patient name: Darlene Lira  YOB: 1985   MRN: [id number]  Admission Date: 2024  Referring Provider: Rosalia Foster MD    ID: 38 y.o.  at 36w2d    Pre-Operative Dx:   Intrauterine pregnancy at 36w2d weeks   Intrauterine growth retardation  Desires sterilization  Twin gestation      Postoperative dx:    Intrauterine pregnancy at 36w2d weeks   Intrauterine growth retardation  Desires sterilization    Twin gestation Vertex/breech            Procedure(s): primarylow transverse  delivery , bilateral salpingectomy    Surgeons: Surgeons and Role:     * Rosalia Foster MD - Primary    Staff:  Surgeon(s):  Rosalia Foster MD           Assistant: Soraya Carpenter    Anesthesia: Spinal    Estimated Blood Loss:  650  mL    IV Fluids: 1800 mls    Preoperative antibiotic: Ancef (cefazolin) 3 grams    Blood products:   Blood Administration Record (From admission, onward)      None            Pathology:   Order Name Source Comment Collection Info Order Time   TISSUE PATHOLOGY EXAM Placenta  Collected By: Tamica Calle RN 2024  3:48 PM     Specimen source(s):   Placenta          Release to patient   Routine Release        TISSUE PATHOLOGY EXAM Placenta  Collected By: Tamica Calle RN 2024  3:49 PM     Specimen source(s):   Placenta          Release to patient   Routine Release        TISSUE PATHOLOGY EXAM Fallopian Tubes, Bilateral  Collected By: Tamica Calle RN 2024  3:49 PM     Specimen source(s):   Fallopian Tubes, Bilateral          Release to patient   Routine Release            Drains: Whitlock catheter to gravity    Complications: None    Condition: Stable to recovery room    Infant:              Darlene LiraAida DIEGO [[id number]]         PankajTristen [[id number]]        Gender:    Wendy LiraFredrick CORTEZ [[id number]]   female      PankajTristen [[id number]]   female  infant    Weight:    Darlene LiraIsmal A [[id number]]   2315 g  "(5 lb 1.7 oz)      Tristen Lira [[id number]]   2680 g (5 lb 14.5 oz)     Apgars:    Tristen Lira [[id number]]   6      Tristen iLra [[id number]]   8  @ 1 minute /        Tristen Lira [[id number]]   9      Tristen Lira [[id number]]   9  @ 5 minutes    Cord gases: Venous:       Tristen Lira [[id number]]   No results found for: \"PHCVEN\", \"BECVEN\"      Tristen Lira [[id number]]   No results found for: \"PHCVEN\", \"BECVEN\"      Arterial:       Tristen Lira [[id number]]   No results found for: \"PHCART\", \"BECART\"      Tristen Lira [[id number]]   No results found for: \"PHCART\", \"BECART\"          Operative Summary: Patient was counseled about the risks of  including the possibility of bleeding infection damage to bowel bladder and ureter as well as postop issues like blood clots hematomas pneumonias.  Seen her  both wanted to proceed with .  Twin A had growth retardation twin B was normal   After obtaining informed consent the patient was taken to the operating room where adequate anesthesia was obtained.  Whitlock catheter was placed in the bladder preoperatively.  IV antibiotics were given preoperatively.       The abdomen was prepped and draped in the usual sterile fashion for  delivery.  After confirming adequate anesthesia a Pfannenstiel skin incision was made with the scalpel and carried through to the underlying layer of fascia.  The fascia was incised in the midline and the incision extended laterally with the Blackwood scissors and with blunt dissection.       The upper aspect of the fascia was grasped with 2 Kocher clamps, elevated, and dissected off the underlying rectus muscles bluntly and with the Blackwood scissors.  The Kocher clamps were removed and applied to the inferior aspect of the fascia.  The fascia was dissected off of the rectus muscles in the same fashion.  The peritoneum was entered bluntly.  " The incision was stretched and the bladder blade and Jj retractor inserted for visualization of the uterus. A bladder flap was made and bladder blade replaced.        The uterus was incised with the scalpel in a low transverse fashion.  The uterine incision was entered digitally and the incision extended bluntly in a craniocaudal fashion.  Retractors were removed and membranes were ruptured.  The infant was delivered atraumatically from vertex/breech presentation.  Twin B was delivered by complete breech extraction.  Twin A was delivered vertex without difficulty.  They were in separate sacs.  The umbilical cord was milked 4 times, clamped and cut and the nose and mouth bulb suctioned.  The infant was handed off to waiting pediatric staff.       Cord blood gases were not collected.  Cord blood was collected.  The placenta was removed using cord traction and uterine massage.  The uterus was exteriorized and cleared of all clots and debris.  The uterine incision was repaired with #1 chromic in a running locked fashion. A double layer technique was used.  Additional hemostatic measures required: electrocautery, figure-of-eight sutures, and SurgiCel.  There were multiple areas of bleeding and multiple vessels that needed to be sutured.  This was accomplished but was very tedious.    The incision was inspected and excellent hemostasis was noted.  The tubes and ovaries were noted to be normal.  Both tubes were grasped at the fimbriated end and using the Enseal device the mesosalpinx pedicles were created and cauterized.  The tubes were finally clamped at the cornu and transected and removed bilaterally.  The uterus was returned to the abdomen.  The gutters were cleared of all clots and debris.  Irrigation was used.  The uterine incision was again inspected and found to be hemostatic.       The peritoneum was reapproximated with 2-0 Vicryl in a running fashion.  The fascia was closed with 0 Vicryl in a running  fashion.  The subcutaneous space was reapproximated using 3-0 plain gut in interrupted stiches.      The skin was closed using staples, and dressing placed. All sharp, instrument, and sponge counts were correct. The patient was transferred to the recovery room in stable condition.    Surgical Assist: The OR crew assisted me and there were no complications          Rosalia Foster MD  7/4/2024

## 2024-07-05 LAB
BACTERIA SPEC AEROBE CULT: NORMAL
BASOPHILS # BLD AUTO: 0.04 10*3/MM3 (ref 0–0.2)
BASOPHILS NFR BLD AUTO: 0.4 % (ref 0–1.5)
DEPRECATED RDW RBC AUTO: 51 FL (ref 37–54)
EOSINOPHIL # BLD AUTO: 0.16 10*3/MM3 (ref 0–0.4)
EOSINOPHIL NFR BLD AUTO: 1.7 % (ref 0.3–6.2)
ERYTHROCYTE [DISTWIDTH] IN BLOOD BY AUTOMATED COUNT: 15.8 % (ref 12.3–15.4)
GLUCOSE BLDC GLUCOMTR-MCNC: 104 MG/DL (ref 70–130)
GLUCOSE BLDC GLUCOMTR-MCNC: 76 MG/DL (ref 70–130)
GLUCOSE BLDC GLUCOMTR-MCNC: 86 MG/DL (ref 70–130)
GLUCOSE BLDC GLUCOMTR-MCNC: 88 MG/DL (ref 70–130)
HCT VFR BLD AUTO: 28 % (ref 34–46.6)
HGB BLD-MCNC: 9.3 G/DL (ref 12–15.9)
IMM GRANULOCYTES # BLD AUTO: 0.05 10*3/MM3 (ref 0–0.05)
IMM GRANULOCYTES NFR BLD AUTO: 0.5 % (ref 0–0.5)
LYMPHOCYTES # BLD AUTO: 1.46 10*3/MM3 (ref 0.7–3.1)
LYMPHOCYTES NFR BLD AUTO: 15.9 % (ref 19.6–45.3)
MCH RBC QN AUTO: 30 PG (ref 26.6–33)
MCHC RBC AUTO-ENTMCNC: 33.2 G/DL (ref 31.5–35.7)
MCV RBC AUTO: 90.3 FL (ref 79–97)
MONOCYTES # BLD AUTO: 0.74 10*3/MM3 (ref 0.1–0.9)
MONOCYTES NFR BLD AUTO: 8.1 % (ref 5–12)
NEUTROPHILS NFR BLD AUTO: 6.72 10*3/MM3 (ref 1.7–7)
NEUTROPHILS NFR BLD AUTO: 73.4 % (ref 42.7–76)
NRBC BLD AUTO-RTO: 0 /100 WBC (ref 0–0.2)
PLATELET # BLD AUTO: 231 10*3/MM3 (ref 140–450)
PMV BLD AUTO: 9.8 FL (ref 6–12)
RBC # BLD AUTO: 3.1 10*6/MM3 (ref 3.77–5.28)
WBC NRBC COR # BLD AUTO: 9.17 10*3/MM3 (ref 3.4–10.8)

## 2024-07-05 PROCEDURE — 85025 COMPLETE CBC W/AUTO DIFF WBC: CPT | Performed by: OBSTETRICS & GYNECOLOGY

## 2024-07-05 PROCEDURE — 25010000002 KETOROLAC TROMETHAMINE PER 15 MG: Performed by: OBSTETRICS & GYNECOLOGY

## 2024-07-05 PROCEDURE — 25010000002 RHO D IMMUNE GLOBULIN 1500 UNIT/2ML SOLUTION PREFILLED SYRINGE: Performed by: OBSTETRICS & GYNECOLOGY

## 2024-07-05 PROCEDURE — 25010000002 ENOXAPARIN PER 10 MG: Performed by: OBSTETRICS & GYNECOLOGY

## 2024-07-05 PROCEDURE — 82948 REAGENT STRIP/BLOOD GLUCOSE: CPT

## 2024-07-05 RX ORDER — LABETALOL 200 MG/1
100 TABLET, FILM COATED ORAL EVERY 12 HOURS SCHEDULED
Status: DISCONTINUED | OUTPATIENT
Start: 2024-07-05 | End: 2024-07-07 | Stop reason: HOSPADM

## 2024-07-05 RX ADMIN — ACETAMINOPHEN 1000 MG: 500 TABLET ORAL at 02:56

## 2024-07-05 RX ADMIN — PRENATAL VITAMINS-IRON FUMARATE 27 MG IRON-FOLIC ACID 0.8 MG TABLET 1 TABLET: at 08:10

## 2024-07-05 RX ADMIN — KETOROLAC TROMETHAMINE 15 MG: 15 INJECTION, SOLUTION INTRAMUSCULAR; INTRAVENOUS at 00:23

## 2024-07-05 RX ADMIN — LABETALOL HYDROCHLORIDE 100 MG: 200 TABLET, FILM COATED ORAL at 21:57

## 2024-07-05 RX ADMIN — DOCUSATE SODIUM 100 MG: 100 CAPSULE, LIQUID FILLED ORAL at 08:10

## 2024-07-05 RX ADMIN — KETOROLAC TROMETHAMINE 15 MG: 15 INJECTION, SOLUTION INTRAMUSCULAR; INTRAVENOUS at 18:19

## 2024-07-05 RX ADMIN — HUMAN RHO(D) IMMUNE GLOBULIN 1500 UNITS: 1500 SOLUTION INTRAMUSCULAR; INTRAVENOUS at 01:16

## 2024-07-05 RX ADMIN — LABETALOL HYDROCHLORIDE 100 MG: 200 TABLET, FILM COATED ORAL at 13:22

## 2024-07-05 RX ADMIN — ACETAMINOPHEN 1000 MG: 500 TABLET ORAL at 15:46

## 2024-07-05 RX ADMIN — ACETAMINOPHEN 1000 MG: 500 TABLET ORAL at 08:09

## 2024-07-05 RX ADMIN — KETOROLAC TROMETHAMINE 15 MG: 15 INJECTION, SOLUTION INTRAMUSCULAR; INTRAVENOUS at 06:06

## 2024-07-05 RX ADMIN — KETOROLAC TROMETHAMINE 15 MG: 15 INJECTION, SOLUTION INTRAMUSCULAR; INTRAVENOUS at 13:22

## 2024-07-05 RX ADMIN — ACETAMINOPHEN 650 MG: 325 TABLET ORAL at 21:43

## 2024-07-05 RX ADMIN — ENOXAPARIN SODIUM 40 MG: 100 INJECTION SUBCUTANEOUS at 21:43

## 2024-07-05 RX ADMIN — SIMETHICONE 80 MG: 80 TABLET, CHEWABLE ORAL at 08:10

## 2024-07-05 NOTE — ANESTHESIA POSTPROCEDURE EVALUATION
Patient: Darlene Lira    Procedure Summary       Date: 24 Room / Location: [facility] LABOR DELIVERY   [facility] LABOR DELIVERY    Anesthesia Start:  Anesthesia Stop:     Procedure:  SECTION PRIMARY WITH TUBAL (Abdomen) Diagnosis:     Surgeons: Rosalia Foster MD Provider: Carlene Monroe DO    Anesthesia Type: ITN, spinal ASA Status: 3            Anesthesia Type: ITN, spinal    Vitals  Vitals Value Taken Time   /56 24 0726   Temp 98 °F (36.7 °C) 24 0726   Pulse 81 24 0726   Resp 16 24 07   SpO2 96 % 24 1831   Vitals shown include unfiled device data.        Post Anesthesia Care and Evaluation    Patient location during evaluation: bedside  Patient participation: complete - patient participated  Level of consciousness: awake and alert  Pain management: adequate    Airway patency: patent  Anesthetic complications: No anesthetic complications    Cardiovascular status: acceptable  Respiratory status: acceptable  Hydration status: acceptable  Post Neuraxial Block status: Motor and sensory function returned to baseline and No signs or symptoms of PDPH

## 2024-07-05 NOTE — PROGRESS NOTES
2024    Name:Darlene Lira    MR#:[id number]     PROGRESS NOTE:  Post-Op 1 S/P    HD:1    Subjective   38 y.o. yo Female  s/p CS at 36w2d doing well. Pain well controlled. Tolerating regular diet and having flatus. Lochia normal.  She denies headache, visual changes.      Twin gestation in third trimester    IUGR (intrauterine growth retardation) affecting mother, third trimester, not applicable or unspecified fetus       Objective    Vitals  Temp:  Temp:  [97.5 °F (36.4 °C)-98.6 °F (37 °C)] 97.7 °F (36.5 °C)  Temp src: Oral  BP:  BP: ()/(47-79) 121/56  Pulse:  Heart Rate:  [60-81] 81  RR:   Resp:  [14-18] 16    General Awake, alert, no distress  Abdomen Soft, non-distended, fundus firm, below umbilicus, appropriately tender  Incision  Intact, no erythema or exudate  Extremities Calves NT bilaterally     I/O last 3 completed shifts:  In:  [I.V.:]  Out:  [Urine:1250; Blood:760]    LABS:   Lab Results   Component Value Date    WBC 9.31 2024    HGB 10.7 (L) 2024    HCT 31.3 (L) 2024    MCV 88.2 2024     2024       Infant:      Tristen Lira [[id number]]   female      Tristen Lira [[id number]]   female       Assessment   1.  POD 1, doing well; am labs pending   2.  Rh negative; babies Rh positive and Rh negative--- Rhogam given today 24   3.  CHTN-- BP wnl on no meds; asymptomatic; PEP wnl    4.  MO-- Lovenox qd   5.  GDM-- fingersticks dc'd    Plan: Routine postoperative care.  Monitor BP.  Advance.      Active Problems:   None      Brielle Saeed, APRN  2024 07:51 EDT

## 2024-07-05 NOTE — PLAN OF CARE
Problem: Adult Inpatient Plan of Care  Goal: Plan of Care Review  Outcome: Ongoing, Progressing  Goal: Patient-Specific Goal (Individualized)  Outcome: Ongoing, Progressing  Goal: Absence of Hospital-Acquired Illness or Injury  Outcome: Ongoing, Progressing  Intervention: Identify and Manage Fall Risk  Recent Flowsheet Documentation  Taken 2024 0200 by Genesis Downing RN  Safety Promotion/Fall Prevention: safety round/check completed  Taken 2024 0023 by Genesis Downing RN  Safety Promotion/Fall Prevention: safety round/check completed  Taken 2024 2200 by Genesis Downing RN  Safety Promotion/Fall Prevention: safety round/check completed  Taken 2024 1900 by Genesis Downing RN  Safety Promotion/Fall Prevention:   safety round/check completed   room organization consistent   nonskid shoes/slippers when out of bed   clutter free environment maintained   assistive device/personal items within reach  Intervention: Prevent Skin Injury  Recent Flowsheet Documentation  Taken 2024 1900 by Genesis Downing RN  Body Position: position changed independently  Intervention: Prevent and Manage VTE (Venous Thromboembolism) Risk  Recent Flowsheet Documentation  Taken 2024 1900 by Genesis Downing RN  Activity Management: bedrest  VTE Prevention/Management:   bilateral   compression stockings on  Goal: Optimal Comfort and Wellbeing  Outcome: Ongoing, Progressing  Intervention: Monitor Pain and Promote Comfort  Recent Flowsheet Documentation  Taken 2024 1900 by Genesis Downing RN  Pain Management Interventions: pain management plan reviewed with patient/caregiver  Intervention: Provide Person-Centered Care  Recent Flowsheet Documentation  Taken 2024 1900 by Genesis Downing RN  Trust Relationship/Rapport:   care explained   questions answered   questions encouraged  Goal: Readiness for Transition of Care  Outcome: Ongoing, Progressing     Problem: Bleeding ( Delivery)  Goal: Bleeding is  Controlled  Outcome: Ongoing, Progressing     Problem: Change in Fetal Wellbeing ( Delivery)  Goal: Stable Fetal Wellbeing  Outcome: Ongoing, Progressing  Intervention: Promote and Monitor Fetal Wellbeing  Recent Flowsheet Documentation  Taken 2024 by Genesis Downing RN  Body Position: position changed independently     Problem: Infection ( Delivery)  Goal: Absence of Infection Signs and Symptoms  Outcome: Ongoing, Progressing     Problem: Respiratory Compromise ( Delivery)  Goal: Effective Oxygenation and Ventilation  Outcome: Ongoing, Progressing     Problem: Multiple Gestation  Goal: Pregnancy Maintained Until Desired Gestational Age  Outcome: Ongoing, Progressing     Problem: Skin Injury Risk Increased  Goal: Skin Health and Integrity  Outcome: Ongoing, Progressing  Intervention: Optimize Skin Protection  Recent Flowsheet Documentation  Taken 2024 by Genesis Downing RN  Head of Bed (HOB) Positioning: HOB elevated     Problem: Adjustment to Role Transition (Postpartum  Delivery)  Goal: Successful Maternal Role Transition  Outcome: Ongoing, Progressing     Problem: Bleeding (Postpartum  Delivery)  Goal: Hemostasis  Outcome: Ongoing, Progressing     Problem: Infection (Postpartum  Delivery)  Goal: Absence of Infection Signs and Symptoms  Outcome: Ongoing, Progressing     Problem: Pain (Postpartum  Delivery)  Goal: Acceptable Pain Control  Outcome: Ongoing, Progressing  Intervention: Prevent or Manage Pain  Recent Flowsheet Documentation  Taken 2024 by Genesis Downing RN  Pain Management Interventions: pain management plan reviewed with patient/caregiver     Problem: Postoperative Nausea and Vomiting (Postpartum  Delivery)  Goal: Nausea and Vomiting Relief  Outcome: Ongoing, Progressing     Problem: Postoperative Urinary Retention (Postpartum  Delivery)  Goal: Effective Urinary Elimination  Outcome: Ongoing, Progressing    Goal Outcome Evaluation:

## 2024-07-06 LAB
GLUCOSE BLDC GLUCOMTR-MCNC: 100 MG/DL (ref 70–130)
GLUCOSE BLDC GLUCOMTR-MCNC: 117 MG/DL (ref 70–130)
GLUCOSE BLDC GLUCOMTR-MCNC: 96 MG/DL (ref 70–130)
GLUCOSE BLDC GLUCOMTR-MCNC: 98 MG/DL (ref 70–130)

## 2024-07-06 PROCEDURE — 82948 REAGENT STRIP/BLOOD GLUCOSE: CPT

## 2024-07-06 PROCEDURE — 0503F POSTPARTUM CARE VISIT: CPT

## 2024-07-06 PROCEDURE — 25010000002 ENOXAPARIN PER 10 MG: Performed by: OBSTETRICS & GYNECOLOGY

## 2024-07-06 RX ORDER — FERROUS SULFATE 325(65) MG
325 TABLET ORAL 2 TIMES DAILY WITH MEALS
Status: DISCONTINUED | OUTPATIENT
Start: 2024-07-06 | End: 2024-07-07 | Stop reason: HOSPADM

## 2024-07-06 RX ADMIN — IBUPROFEN 600 MG: 600 TABLET, FILM COATED ORAL at 12:45

## 2024-07-06 RX ADMIN — ENOXAPARIN SODIUM 40 MG: 100 INJECTION SUBCUTANEOUS at 09:03

## 2024-07-06 RX ADMIN — ENOXAPARIN SODIUM 40 MG: 100 INJECTION SUBCUTANEOUS at 21:32

## 2024-07-06 RX ADMIN — IBUPROFEN 600 MG: 600 TABLET, FILM COATED ORAL at 18:25

## 2024-07-06 RX ADMIN — IBUPROFEN 600 MG: 600 TABLET, FILM COATED ORAL at 00:11

## 2024-07-06 RX ADMIN — ACETAMINOPHEN 650 MG: 325 TABLET ORAL at 15:51

## 2024-07-06 RX ADMIN — IBUPROFEN 600 MG: 600 TABLET, FILM COATED ORAL at 06:05

## 2024-07-06 RX ADMIN — LABETALOL HYDROCHLORIDE 100 MG: 200 TABLET, FILM COATED ORAL at 21:32

## 2024-07-06 RX ADMIN — ACETAMINOPHEN 650 MG: 325 TABLET ORAL at 21:32

## 2024-07-06 RX ADMIN — FERROUS SULFATE TAB 325 MG (65 MG ELEMENTAL FE) 325 MG: 325 (65 FE) TAB at 09:02

## 2024-07-06 RX ADMIN — LABETALOL HYDROCHLORIDE 100 MG: 200 TABLET, FILM COATED ORAL at 09:02

## 2024-07-06 RX ADMIN — ACETAMINOPHEN 650 MG: 325 TABLET ORAL at 04:17

## 2024-07-06 RX ADMIN — SIMETHICONE 80 MG: 80 TABLET, CHEWABLE ORAL at 09:02

## 2024-07-06 RX ADMIN — SIMETHICONE 80 MG: 80 TABLET, CHEWABLE ORAL at 18:25

## 2024-07-06 RX ADMIN — ACETAMINOPHEN 650 MG: 325 TABLET ORAL at 09:42

## 2024-07-06 RX ADMIN — FERROUS SULFATE TAB 325 MG (65 MG ELEMENTAL FE) 325 MG: 325 (65 FE) TAB at 18:25

## 2024-07-06 RX ADMIN — DOCUSATE SODIUM 100 MG: 100 CAPSULE, LIQUID FILLED ORAL at 09:02

## 2024-07-06 RX ADMIN — PRENATAL VITAMINS-IRON FUMARATE 27 MG IRON-FOLIC ACID 0.8 MG TABLET 1 TABLET: at 09:03

## 2024-07-06 NOTE — PROGRESS NOTES
Hannah Lira  : 1985  MRN: [id number]  CSN: [id number]    Hospital Day: 3    Post-operative Day #2  Subjective     CC: hospital follow-up    Her pain is well controlled with ibuprofen/acetaminophen. Vaginal bleeding is normal in amount. She is ambulating without difficulty. She is passing gas. Her bowels are working normally. She is voiding without difficulty. She is bottle feeding. Her babies are doing well. Rhogam was given.     Objective     Min/max vitals past 24 hours:   Temp  Min: 97.6 °F (36.4 °C)  Max: 98.2 °F (36.8 °C)  BP  Min: 136/63  Max: 149/65  Pulse  Min: 79  Max: 94  Resp  Min: 16  Max: 18        General: well developed; well nourished  no acute distress   Abdomen: soft, non-tender; no masses  no umbilical or inguinal hernias are present  no hepato-splenomegaly  incision is clean, dry, intact, and without drainage   Pelvic: Not performed   Ext: Calves NT     Results from last 7 days   Lab Units 24  0757 24  1259   WBC 10*3/mm3 9.17 9.31   HEMOGLOBIN g/dL 9.3* 10.7*   HEMATOCRIT % 28.0* 31.3*   PLATELETS 10*3/mm3 231 246     Lab Results   Component Value Date    RH Negative 2024    HEPBSAG Negative 2024     Results from last 7 days   Lab Units 24  1259   TREPONEMA PALLIDUM AB TOTAL  Non-Reactive                Assessment   POD #2 S/P Primary   Postpartum anemia     Plan   Continue routine postpartum care- anticipate d/c home 24  Continue routine post-operative care  Ambulate  Start iron supplement    Myla Aj, APRJULIOCESAR  2024  12:52 EDT

## 2024-07-07 VITALS
OXYGEN SATURATION: 98 % | DIASTOLIC BLOOD PRESSURE: 69 MMHG | SYSTOLIC BLOOD PRESSURE: 134 MMHG | RESPIRATION RATE: 16 BRPM | TEMPERATURE: 98.2 F | HEART RATE: 83 BPM

## 2024-07-07 PROBLEM — O10.919 CHRONIC HYPERTENSION AFFECTING PREGNANCY: Status: RESOLVED | Noted: 2024-06-27 | Resolved: 2024-07-07

## 2024-07-07 PROBLEM — O09.529 ANTEPARTUM MULTIGRAVIDA OF ADVANCED MATERNAL AGE: Status: RESOLVED | Noted: 2024-03-14 | Resolved: 2024-07-07

## 2024-07-07 PROBLEM — O30.003 TWIN GESTATION IN THIRD TRIMESTER: Status: RESOLVED | Noted: 2024-07-04 | Resolved: 2024-07-07

## 2024-07-07 PROBLEM — O26.899 RH NEGATIVE, ANTEPARTUM: Status: RESOLVED | Noted: 2024-05-16 | Resolved: 2024-07-07

## 2024-07-07 PROBLEM — O24.414 INSULIN CONTROLLED GESTATIONAL DIABETES MELLITUS (GDM) DURING PREGNANCY, ANTEPARTUM: Status: RESOLVED | Noted: 2024-01-25 | Resolved: 2024-07-07

## 2024-07-07 PROBLEM — Z67.91 RH NEGATIVE, ANTEPARTUM: Status: RESOLVED | Noted: 2024-05-16 | Resolved: 2024-07-07

## 2024-07-07 PROBLEM — O36.5921 IUGR (INTRAUTERINE GROWTH RESTRICTION) AFFECTING CARE OF MOTHER, SECOND TRIMESTER, FETUS 1: Status: RESOLVED | Noted: 2024-05-18 | Resolved: 2024-07-07

## 2024-07-07 PROBLEM — O36.5930 IUGR (INTRAUTERINE GROWTH RETARDATION) AFFECTING MOTHER, THIRD TRIMESTER, NOT APPLICABLE OR UNSPECIFIED FETUS: Status: RESOLVED | Noted: 2024-07-04 | Resolved: 2024-07-07

## 2024-07-07 PROBLEM — Z34.90 PREGNANCY: Status: RESOLVED | Noted: 2024-01-25 | Resolved: 2024-07-07

## 2024-07-07 PROBLEM — O30.049 TWIN PREGNANCY, DICHORIONIC/DIAMNIOTIC, UNSPECIFIED TRIMESTER: Status: RESOLVED | Noted: 2024-01-25 | Resolved: 2024-07-07

## 2024-07-07 LAB — GLUCOSE BLDC GLUCOMTR-MCNC: 102 MG/DL (ref 70–130)

## 2024-07-07 PROCEDURE — 25010000002 ENOXAPARIN PER 10 MG: Performed by: OBSTETRICS & GYNECOLOGY

## 2024-07-07 PROCEDURE — 82948 REAGENT STRIP/BLOOD GLUCOSE: CPT

## 2024-07-07 PROCEDURE — 0503F POSTPARTUM CARE VISIT: CPT

## 2024-07-07 RX ORDER — FERROUS SULFATE 325(65) MG
325 TABLET ORAL 2 TIMES DAILY WITH MEALS
Qty: 120 TABLET | Refills: 1 | Status: SHIPPED | OUTPATIENT
Start: 2024-07-07

## 2024-07-07 RX ORDER — PSEUDOEPHEDRINE HCL 30 MG
100 TABLET ORAL 2 TIMES DAILY PRN
Qty: 60 CAPSULE | Refills: 0 | Status: SHIPPED | OUTPATIENT
Start: 2024-07-07

## 2024-07-07 RX ORDER — OXYCODONE HYDROCHLORIDE 5 MG/1
5 TABLET ORAL EVERY 6 HOURS PRN
Qty: 8 TABLET | Refills: 0 | Status: SHIPPED | OUTPATIENT
Start: 2024-07-07 | End: 2024-07-09

## 2024-07-07 RX ORDER — IBUPROFEN 600 MG/1
600 TABLET ORAL EVERY 6 HOURS PRN
Qty: 60 TABLET | Refills: 1 | Status: SHIPPED | OUTPATIENT
Start: 2024-07-07

## 2024-07-07 RX ORDER — LABETALOL 100 MG/1
100 TABLET, FILM COATED ORAL EVERY 12 HOURS SCHEDULED
Qty: 60 TABLET | Refills: 0 | Status: SHIPPED | OUTPATIENT
Start: 2024-07-07

## 2024-07-07 RX ADMIN — IBUPROFEN 600 MG: 600 TABLET, FILM COATED ORAL at 00:31

## 2024-07-07 RX ADMIN — FERROUS SULFATE TAB 325 MG (65 MG ELEMENTAL FE) 325 MG: 325 (65 FE) TAB at 07:55

## 2024-07-07 RX ADMIN — IBUPROFEN 600 MG: 600 TABLET, FILM COATED ORAL at 06:40

## 2024-07-07 RX ADMIN — PRENATAL VITAMINS-IRON FUMARATE 27 MG IRON-FOLIC ACID 0.8 MG TABLET 1 TABLET: at 07:55

## 2024-07-07 RX ADMIN — ENOXAPARIN SODIUM 40 MG: 100 INJECTION SUBCUTANEOUS at 07:55

## 2024-07-07 RX ADMIN — ACETAMINOPHEN 650 MG: 325 TABLET ORAL at 10:51

## 2024-07-07 RX ADMIN — OXYCODONE HYDROCHLORIDE 5 MG: 5 TABLET ORAL at 00:44

## 2024-07-07 RX ADMIN — OXYCODONE HYDROCHLORIDE 5 MG: 5 TABLET ORAL at 05:32

## 2024-07-07 RX ADMIN — ACETAMINOPHEN 650 MG: 325 TABLET ORAL at 03:43

## 2024-07-07 RX ADMIN — LABETALOL HYDROCHLORIDE 100 MG: 200 TABLET, FILM COATED ORAL at 07:55

## 2024-07-07 RX ADMIN — IBUPROFEN 600 MG: 600 TABLET, FILM COATED ORAL at 12:12

## 2024-07-07 NOTE — DISCHARGE SUMMARY
Discharge Summary     Hannah Lira  : 1985  MRN: [id number]  [facility]: [id number]    Date of Admission: 2024   Date of Discharge:  2024   Delivering Physician: Rosalia Foster MD       Admission Diagnosis: Pregnancy at 36w2d  Twin gestation  IUGR affecting mother, third trimester, not applicable or unspecified fetus     Discharge Diagnosis: Same as above plus  Pregnancy at 36w2d - delivered  Twin gestation  Chronic hypertension   D/C Hospital Problem List:   Postpartum care following  delivery     Procedures: Primary  (LTCS)     Hospital Course  See the completed operative report for details regarding her delivery.    Her post-operative course was unremarkable.  On POD # 3 she felt like she was ready for D/C.  She was evaluated by  myself  who agreed she was able to be discharged to home.  She had no febrile morbidity. She had normal bowel and bladder function and was hemodynamically stable.  Her wound was healing well without obvious signs of infections.    Infant     Tristen Lira [[id number]]   female      Tristen Lira [[id number]]   female  fetus weighing      Tristen Lira [[id number]]   2315 g (5 lb 1.7 oz)      Tristen Lira [[id number]]   2680 g (5 lb 14.5 oz)   Apgars -       Tristen Lira [[id number]]   6      Tristen Lira [[id number]]   8 @ 1 minute /       Tristen Lira [[id number]]   9      Tristen Lira [[id number]]   9 @ 5 minutes.    Discharge labs  Lab Results   Component Value Date    WBC 9.17 2024    HGB 9.3 (L) 2024    HCT 28.0 (L) 2024     2024       Discharge Medications     Discharge Medications        Continue These Medications        Instructions Start Date   B-D UF III MINI PEN NEEDLES 31G X 5 MM misc  Generic drug: Insulin Pen Needle   1 Units, Does not apply, Nightly      Blood Glucose Monitoring Suppl w/Device kit   Use daily to test  blood sugars      Lancets misc   Test blood sugars QID             ASK your doctor about these medications        Instructions Start Date   aspirin 81 MG EC tablet   81 mg, Oral, Daily      Blood Glucose Test strip   Test blood sugars QID      cetirizine 10 MG tablet  Commonly known as: zyrTEC   10 mg, Oral, Daily      ferrous sulfate 325 (65 FE) MG tablet   325 mg, Oral, Daily With Breakfast      FIBER ADULT GUMMIES PO   Oral, 2 per day      Insulin Glargine 100 UNIT/ML injection pen  Commonly known as: LANTUS SOLOSTAR   48 Units, Subcutaneous, Nightly, Please fill early- patient has adjusted dose during pregnancy      labetalol 300 MG tablet  Commonly known as: NORMODYNE   300 mg, Oral, Every 8 Hours      prenatal vitamin 27-0.8 27-0.8 MG tablet tablet   Oral, Daily      Turmeric 500 MG capsule   Oral, Daily, With black pepper       vitamin C 250 MG tablet  Commonly known as: ASCORBIC ACID   250 mg, Oral, Daily               Discharge Disposition    Condition on Discharge: good   Follow-up: 1 week with  Dr Kristin HOFFMANN for BP check, 2 weeks for incision check     TAHIRA Hayes  7/7/2024

## 2024-07-08 LAB
CYTO UR: NORMAL
LAB AP CASE REPORT: NORMAL
LAB AP CLINICAL INFORMATION: NORMAL
PATH REPORT.FINAL DX SPEC: NORMAL
PATH REPORT.GROSS SPEC: NORMAL

## 2024-07-18 ENCOUNTER — POSTPARTUM VISIT (OUTPATIENT)
Dept: OBSTETRICS AND GYNECOLOGY | Facility: CLINIC | Age: 39
End: 2024-07-18
Payer: COMMERCIAL

## 2024-07-18 VITALS
DIASTOLIC BLOOD PRESSURE: 82 MMHG | WEIGHT: 249.8 LBS | BODY MASS INDEX: 44.26 KG/M2 | HEIGHT: 63 IN | SYSTOLIC BLOOD PRESSURE: 140 MMHG

## 2024-07-18 DIAGNOSIS — Z01.30 BP CHECK: ICD-10-CM

## 2024-07-18 DIAGNOSIS — Z48.89 ENCOUNTER FOR POST SURGICAL WOUND CHECK: ICD-10-CM

## 2024-07-18 NOTE — PROGRESS NOTES
Chief Complaint   Patient presents with    Postpartum Care     2wk pp visit       Postpartum Visit         Darlene Lira is a 38 y.o.  who presents today for a 2 week(s) postpartum check.      C/S:  di/di twins, recommended by PDC         Hatfield, Rylee June [[id number]]   , Low Transverse      Jaleesa Lira [[id number]]   , Low Transverse    Information for the patient's :  Hatfield, Rylee June [[id number]]   2024   female   Rylee June Hatfield   2315 g (5 lb 1.7 oz)   Gestational Age: 36w2d   Information for the patient's :  Jaleesa Lira [[id number]]   2024   female   Jaleesa Lira   2680 g (5 lb 14.5 oz)   Gestational Age: 36w2d      Babies Discharged with Mom  Delivering MD: Rosalia Foster MD.    Pregnancy complications: GDM requiring insulin, CHTN, GHTN, IUGR     Patient reports her incision is clean, dry, intact. Patient describes vaginal bleeding as light. She is bottle feeding. Pt is taking Labetalol 100mg BID for CHTN. Pt is not taking BP at home. She would like to discuss the following complaints today: pt reports still having numbness in her lower abdomen & back spasm from spinal block     She had a bilateral tubal performed for contraception.     Patient denies concerns for postpartum depression/anxiety. Patient denies  suicidal or homicidal ideation. Her postpartum depression screening questionnaire: 1. No treatment is indicated      The additional following portions of the patient's history were reviewed and updated as appropriate: allergies, current medications, past family history, past medical history, past social history, past surgical history, and problem list.      Review of Systems   Genitourinary:  Positive for vaginal bleeding.   Skin:  Positive for wound.       I have reviewed and agree with the HPI, ROS, and historical information as entered above. Cortez Eduardo, APRN      Objective   /82   Ht 160  "cm (63\")   Wt 113 kg (249 lb 12.8 oz)   LMP 2023   Breastfeeding No   BMI 44.25 kg/m²     Physical Exam  Vitals and nursing note reviewed.   Constitutional:       Appearance: Normal appearance. She is well-developed.   HENT:      Head: Normocephalic and atraumatic.   Cardiovascular:      Rate and Rhythm: Normal rate and regular rhythm.   Pulmonary:      Effort: Pulmonary effort is normal.      Breath sounds: Normal breath sounds.   Abdominal:      General: A surgical scar is present.      Palpations: Abdomen is soft. Abdomen is not rigid.          Comments: LTI healing well   Musculoskeletal:      Cervical back: Normal range of motion.   Neurological:      Mental Status: She is alert and oriented to person, place, and time.   Psychiatric:         Behavior: Behavior normal.              Assessment and Plan    Problem List Items Addressed This Visit    None  Visit Diagnoses       Postpartum follow-up    -  Primary    Encounter for post surgical wound check        BP check                S/p , 2 week(s) postpartum.  Doing well.    Continued pelvic rest with a return to driving and light physical activity.  Baby doing well.  Bottlefeeding going well.  No si/sx of postpartum depression  Contraception: contraceptive methods: Tubal ligation  Return in about 4 weeks (around 8/15/2024).    Cortez Eduardo, APRN  2024   "

## 2024-09-26 ENCOUNTER — HOSPITAL ENCOUNTER (EMERGENCY)
Facility: HOSPITAL | Age: 39
Discharge: HOME | End: 2024-09-26
Attending: EMERGENCY MEDICINE
Payer: COMMERCIAL

## 2024-09-26 ENCOUNTER — TELEPHONE (OUTPATIENT)
Dept: OBSTETRICS AND GYNECOLOGY | Facility: CLINIC | Age: 39
End: 2024-09-26
Payer: COMMERCIAL

## 2024-09-26 ENCOUNTER — APPOINTMENT (OUTPATIENT)
Dept: ULTRASOUND IMAGING | Facility: HOSPITAL | Age: 39
End: 2024-09-26
Payer: COMMERCIAL

## 2024-09-26 ENCOUNTER — DOCUMENTATION (OUTPATIENT)
Dept: OBSTETRICS AND GYNECOLOGY | Facility: CLINIC | Age: 39
End: 2024-09-26
Payer: COMMERCIAL

## 2024-09-26 VITALS
BODY MASS INDEX: 44.3 KG/M2 | RESPIRATION RATE: 20 BRPM | TEMPERATURE: 98.5 F | OXYGEN SATURATION: 100 % | SYSTOLIC BLOOD PRESSURE: 169 MMHG | HEART RATE: 108 BPM | HEIGHT: 63 IN | DIASTOLIC BLOOD PRESSURE: 110 MMHG | WEIGHT: 250 LBS

## 2024-09-26 DIAGNOSIS — N92.0 MENORRHAGIA WITH REGULAR CYCLE: Primary | ICD-10-CM

## 2024-09-26 DIAGNOSIS — R93.89 THICKENED ENDOMETRIUM: ICD-10-CM

## 2024-09-26 LAB
ALBUMIN SERPL-MCNC: 4.5 G/DL (ref 3.5–5.2)
ALBUMIN/GLOB SERPL: 1.4 G/DL
ALP SERPL-CCNC: 103 U/L (ref 39–117)
ALT SERPL W P-5'-P-CCNC: 27 U/L (ref 1–33)
ANION GAP SERPL CALCULATED.3IONS-SCNC: 11 MMOL/L (ref 5–15)
APTT PPP: 28 SECONDS (ref 60–90)
AST SERPL-CCNC: 21 U/L (ref 1–32)
BACTERIA UR QL AUTO: ABNORMAL /HPF
BASOPHILS # BLD AUTO: 0.03 10*3/MM3 (ref 0–0.2)
BASOPHILS NFR BLD AUTO: 0.3 % (ref 0–1.5)
BILIRUB SERPL-MCNC: 0.7 MG/DL (ref 0–1.2)
BILIRUB UR QL STRIP: NEGATIVE
BUN SERPL-MCNC: 12 MG/DL (ref 6–20)
BUN/CREAT SERPL: 20.3 (ref 7–25)
CALCIUM SPEC-SCNC: 9.3 MG/DL (ref 8.6–10.5)
CHLORIDE SERPL-SCNC: 106 MMOL/L (ref 98–107)
CLARITY UR: ABNORMAL
CO2 SERPL-SCNC: 25 MMOL/L (ref 22–29)
COLOR UR: ABNORMAL
CREAT SERPL-MCNC: 0.59 MG/DL (ref 0.57–1)
DEPRECATED RDW RBC AUTO: 41.3 FL (ref 37–54)
EGFRCR SERPLBLD CKD-EPI 2021: 120 ML/MIN/1.73
EOSINOPHIL # BLD AUTO: 0.16 10*3/MM3 (ref 0–0.4)
EOSINOPHIL NFR BLD AUTO: 1.8 % (ref 0.3–6.2)
ERYTHROCYTE [DISTWIDTH] IN BLOOD BY AUTOMATED COUNT: 13.5 % (ref 12.3–15.4)
GLOBULIN UR ELPH-MCNC: 3.2 GM/DL
GLUCOSE SERPL-MCNC: 107 MG/DL (ref 65–99)
GLUCOSE UR STRIP-MCNC: NEGATIVE MG/DL
HCG INTACT+B SERPL-ACNC: <0.1 MIU/ML
HCT VFR BLD AUTO: 37.1 % (ref 34–46.6)
HGB BLD-MCNC: 12.7 G/DL (ref 12–15.9)
HGB UR QL STRIP.AUTO: ABNORMAL
HOLD SPECIMEN: NORMAL
HYALINE CASTS UR QL AUTO: ABNORMAL /LPF
IMM GRANULOCYTES # BLD AUTO: 0.03 10*3/MM3 (ref 0–0.05)
IMM GRANULOCYTES NFR BLD AUTO: 0.3 % (ref 0–0.5)
INR PPP: 0.93 (ref 0.89–1.12)
KETONES UR QL STRIP: NEGATIVE
LEUKOCYTE ESTERASE UR QL STRIP.AUTO: ABNORMAL
LYMPHOCYTES # BLD AUTO: 1.59 10*3/MM3 (ref 0.7–3.1)
LYMPHOCYTES NFR BLD AUTO: 17.8 % (ref 19.6–45.3)
MCH RBC QN AUTO: 29 PG (ref 26.6–33)
MCHC RBC AUTO-ENTMCNC: 34.2 G/DL (ref 31.5–35.7)
MCV RBC AUTO: 84.7 FL (ref 79–97)
MONOCYTES # BLD AUTO: 0.58 10*3/MM3 (ref 0.1–0.9)
MONOCYTES NFR BLD AUTO: 6.5 % (ref 5–12)
NEUTROPHILS NFR BLD AUTO: 6.54 10*3/MM3 (ref 1.7–7)
NEUTROPHILS NFR BLD AUTO: 73.3 % (ref 42.7–76)
NITRITE UR QL STRIP: NEGATIVE
NRBC BLD AUTO-RTO: 0 /100 WBC (ref 0–0.2)
PH UR STRIP.AUTO: 7 [PH] (ref 5–8)
PLATELET # BLD AUTO: 307 10*3/MM3 (ref 140–450)
PMV BLD AUTO: 9.5 FL (ref 6–12)
POTASSIUM SERPL-SCNC: 4 MMOL/L (ref 3.5–5.2)
PROT SERPL-MCNC: 7.7 G/DL (ref 6–8.5)
PROT UR QL STRIP: ABNORMAL
PROTHROMBIN TIME: 12.5 SECONDS (ref 12.2–14.5)
RBC # BLD AUTO: 4.38 10*6/MM3 (ref 3.77–5.28)
RBC # UR STRIP: ABNORMAL /HPF
REF LAB TEST METHOD: ABNORMAL
SODIUM SERPL-SCNC: 142 MMOL/L (ref 136–145)
SP GR UR STRIP: 1.01 (ref 1–1.03)
SQUAMOUS #/AREA URNS HPF: ABNORMAL /HPF
UROBILINOGEN UR QL STRIP: ABNORMAL
WBC # UR STRIP: ABNORMAL /HPF
WBC NRBC COR # BLD AUTO: 8.93 10*3/MM3 (ref 3.4–10.8)
WHOLE BLOOD HOLD COAG: NORMAL
WHOLE BLOOD HOLD SPECIMEN: NORMAL

## 2024-09-26 PROCEDURE — 85610 PROTHROMBIN TIME: CPT

## 2024-09-26 PROCEDURE — 76830 TRANSVAGINAL US NON-OB: CPT

## 2024-09-26 PROCEDURE — 99284 EMERGENCY DEPT VISIT MOD MDM: CPT

## 2024-09-26 PROCEDURE — 85730 THROMBOPLASTIN TIME PARTIAL: CPT

## 2024-09-26 PROCEDURE — 80053 COMPREHEN METABOLIC PANEL: CPT

## 2024-09-26 PROCEDURE — 85025 COMPLETE CBC W/AUTO DIFF WBC: CPT

## 2024-09-26 PROCEDURE — 84702 CHORIONIC GONADOTROPIN TEST: CPT | Performed by: EMERGENCY MEDICINE

## 2024-09-26 PROCEDURE — 81001 URINALYSIS AUTO W/SCOPE: CPT

## 2024-09-26 RX ORDER — MEDROXYPROGESTERONE ACETATE 10 MG
10 TABLET ORAL DAILY
Qty: 21 TABLET | Refills: 0 | Status: SHIPPED | OUTPATIENT
Start: 2024-09-26 | End: 2024-10-17

## 2024-09-26 RX ORDER — PNV NO.118/IRON FUMARATE/FA 29 MG-1 MG
1 TABLET,CHEWABLE ORAL DAILY
Qty: 365 TABLET | Refills: 0 | Status: SHIPPED | OUTPATIENT
Start: 2024-09-26 | End: 2025-09-26

## 2024-09-26 RX ORDER — SODIUM CHLORIDE 0.9 % (FLUSH) 0.9 %
10 SYRINGE (ML) INJECTION AS NEEDED
Status: DISCONTINUED | OUTPATIENT
Start: 2024-09-26 | End: 2024-09-26 | Stop reason: HOSPADM

## 2024-09-26 RX ORDER — MEDROXYPROGESTERONE ACETATE 10 MG
10 TABLET ORAL ONCE
Status: COMPLETED | OUTPATIENT
Start: 2024-09-26 | End: 2024-09-26

## 2024-09-26 RX ADMIN — MEDROXYPROGESTERONE ACETATE 10 MG: 10 TABLET ORAL at 14:21

## 2024-09-26 NOTE — Clinical Note
[facility] EMERGENCY DEPARTMENT  [address]  [facility] [address]  Phone: [phone]    Darlene Lira was seen and treated in our emergency department on 9/26/2024.  She may return to work on 09/29/2024.         Thank you for choosing [facility].    Demond Mendez MD
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
## 2024-09-26 NOTE — DISCHARGE INSTRUCTIONS
Take the Provera, once daily for the next 3 weeks.  After you finish it you may have another period with heavy bleeding.  Also take a prenatal vitamin with iron daily.  Follow-up in the OB/GYN clinic within 1 month.  Return to the ED for new or concerning symptoms.

## 2024-09-26 NOTE — PROGRESS NOTES
Patient seen in ER for heavy bleeding that is the first 1 she has had since her  in July.  Ultrasound shows lots of tissue in the uterus which I do not think is products of conception.  I think this is the heavy first.  So we put her on Provera 10 mg a day and then return to see us for evaluation.  Her hemoglobin was normal in the ER.

## 2024-09-27 ENCOUNTER — TELEPHONE (OUTPATIENT)
Dept: OBSTETRICS AND GYNECOLOGY | Facility: CLINIC | Age: 39
End: 2024-09-27
Payer: COMMERCIAL

## 2024-10-10 ENCOUNTER — OFFICE VISIT (OUTPATIENT)
Dept: OBSTETRICS AND GYNECOLOGY | Facility: CLINIC | Age: 39
End: 2024-10-10
Payer: COMMERCIAL

## 2024-10-10 VITALS
WEIGHT: 252 LBS | BODY MASS INDEX: 46.38 KG/M2 | HEIGHT: 62 IN | SYSTOLIC BLOOD PRESSURE: 132 MMHG | DIASTOLIC BLOOD PRESSURE: 70 MMHG

## 2024-10-10 DIAGNOSIS — N93.9 ABNORMAL UTERINE BLEEDING (AUB): Primary | ICD-10-CM

## 2024-10-10 PROCEDURE — 99213 OFFICE O/P EST LOW 20 MIN: CPT | Performed by: OBSTETRICS & GYNECOLOGY

## 2024-10-10 RX ORDER — LOSARTAN POTASSIUM 100 MG/1
100 TABLET ORAL DAILY
COMMUNITY

## 2024-10-10 RX ORDER — MEDROXYPROGESTERONE ACETATE 10 MG
TABLET ORAL
Qty: 42 TABLET | Refills: 6 | Status: SHIPPED | OUTPATIENT
Start: 2024-10-10
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
## 2024-10-14 ENCOUNTER — TELEPHONE (OUTPATIENT)
Dept: OBSTETRICS AND GYNECOLOGY | Facility: CLINIC | Age: 39
End: 2024-10-14
Payer: COMMERCIAL

## 2024-10-14 DIAGNOSIS — N93.9 ABNORMAL UTERINE BLEEDING (AUB): Primary | ICD-10-CM

## 2024-10-14 NOTE — TELEPHONE ENCOUNTER
Patient of Dr. Penn; LOV 10/10/24 with Dr. Foster for follow up from ER visit 09/26/24 with AUB. Ultrasound noted endometrial thickness of 2.7 cm. Provera was increased from 10 mg to 20 mg on cycle days 7-28.   Returned patient's call.   States she is not sure why she was scheduled with Dr. Foster. He did her delivery but Dr. Penn is her doctor. She wants to continue seeing Dr. Penn.  States today is Cycle day 20. States bleeding and spotting have stopped with the Provera.   States Dr. Foster had planned to monitor for 6 months with the increased dose of Provera but she does not want to wait until next year.   Her concern is that her menstrual bleeding is usually so heavy that she cannot work and she does not have any PTO to use. Also states she was told she might need a D&C for increased ET; she has met her deductible for this year and would like to have it before the end of the year if it is needed.   Discussed with . Appointment scheduled with Dr. Penn. Patient v/u and agreed.

## 2024-10-14 NOTE — TELEPHONE ENCOUNTER
Caller: Wendy Liraandrew Salcedo    Relationship: Self    Best call back number: [phone]    What is the best time to reach you: ANY    Who are you requesting to speak with (clinical staff, provider,  specific staff member): DR. PEBBLES RAUSCH    Do you know the name of the person who called: DARLENEANDREW LIRA    What was the call regarding: PT WANTS A SECOND OPINION FROM VISIT WITH DR. JEFERSON RILEY. PLEASE CALL BACK OR RESPOND THROUGH [facility]. AND SCHEDULED APPT WITH PT IF NEEDED.     Is it okay if the provider responds through [employer]: YES

## 2024-10-31 ENCOUNTER — OFFICE VISIT (OUTPATIENT)
Dept: OBSTETRICS AND GYNECOLOGY | Facility: CLINIC | Age: 39
End: 2024-10-31
Payer: COMMERCIAL

## 2024-10-31 VITALS
WEIGHT: 257.6 LBS | SYSTOLIC BLOOD PRESSURE: 128 MMHG | BODY MASS INDEX: 47.41 KG/M2 | HEIGHT: 62 IN | DIASTOLIC BLOOD PRESSURE: 62 MMHG

## 2024-10-31 DIAGNOSIS — Z30.011 ENCOUNTER FOR INITIAL PRESCRIPTION OF CONTRACEPTIVE PILLS: ICD-10-CM

## 2024-10-31 DIAGNOSIS — N92.0 MENORRHAGIA WITH REGULAR CYCLE: Primary | ICD-10-CM

## 2024-10-31 PROBLEM — Z72.0 TOBACCO ABUSE: Status: RESOLVED | Noted: 2022-05-11 | Resolved: 2024-10-31

## 2024-10-31 PROCEDURE — 99214 OFFICE O/P EST MOD 30 MIN: CPT | Performed by: OBSTETRICS & GYNECOLOGY

## 2024-10-31 RX ORDER — LOSARTAN POTASSIUM 100 MG/1
100 TABLET ORAL DAILY
Qty: 30 TABLET | Refills: 5 | Status: SHIPPED | OUTPATIENT
Start: 2024-10-31

## 2024-10-31 RX ORDER — NORGESTIMATE AND ETHINYL ESTRADIOL 0.25-0.035
1 KIT ORAL DAILY
Qty: 84 TABLET | Refills: 3 | Status: SHIPPED | OUTPATIENT
Start: 2024-10-31
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
## 2025-01-28 ENCOUNTER — OFFICE VISIT (OUTPATIENT)
Dept: FAMILY MEDICINE CLINIC | Facility: CLINIC | Age: 40
End: 2025-01-28
Payer: COMMERCIAL

## 2025-01-28 ENCOUNTER — LAB (OUTPATIENT)
Dept: LAB | Facility: HOSPITAL | Age: 40
End: 2025-01-28
Payer: COMMERCIAL

## 2025-01-28 VITALS
DIASTOLIC BLOOD PRESSURE: 68 MMHG | HEIGHT: 62 IN | HEART RATE: 99 BPM | BODY MASS INDEX: 48.4 KG/M2 | TEMPERATURE: 97.8 F | RESPIRATION RATE: 21 BRPM | SYSTOLIC BLOOD PRESSURE: 146 MMHG | WEIGHT: 263 LBS | OXYGEN SATURATION: 98 %

## 2025-01-28 DIAGNOSIS — E66.01 CLASS 3 SEVERE OBESITY WITH SERIOUS COMORBIDITY AND BODY MASS INDEX (BMI) OF 40.0 TO 44.9 IN ADULT, UNSPECIFIED OBESITY TYPE: Chronic | ICD-10-CM

## 2025-01-28 DIAGNOSIS — I10 ESSENTIAL HYPERTENSION: Chronic | ICD-10-CM

## 2025-01-28 DIAGNOSIS — Z00.00 ANNUAL PHYSICAL EXAM: Primary | ICD-10-CM

## 2025-01-28 DIAGNOSIS — E66.813 CLASS 3 SEVERE OBESITY WITH SERIOUS COMORBIDITY AND BODY MASS INDEX (BMI) OF 40.0 TO 44.9 IN ADULT, UNSPECIFIED OBESITY TYPE: Chronic | ICD-10-CM

## 2025-01-28 DIAGNOSIS — Z00.00 ANNUAL PHYSICAL EXAM: ICD-10-CM

## 2025-01-28 LAB
BASOPHILS # BLD AUTO: 0.01 10*3/MM3 (ref 0–0.2)
BASOPHILS NFR BLD AUTO: 0.2 % (ref 0–1.5)
BILIRUB UR QL STRIP: NEGATIVE
CLARITY UR: ABNORMAL
COLOR UR: YELLOW
DEPRECATED RDW RBC AUTO: 40.2 FL (ref 37–54)
EOSINOPHIL # BLD AUTO: 0.06 10*3/MM3 (ref 0–0.4)
EOSINOPHIL NFR BLD AUTO: 1.4 % (ref 0.3–6.2)
ERYTHROCYTE [DISTWIDTH] IN BLOOD BY AUTOMATED COUNT: 12.9 % (ref 12.3–15.4)
GLUCOSE UR STRIP-MCNC: NEGATIVE MG/DL
HBA1C MFR BLD: 5.2 % (ref 4.8–5.6)
HCT VFR BLD AUTO: 36.9 % (ref 34–46.6)
HGB BLD-MCNC: 13 G/DL (ref 12–15.9)
HGB UR QL STRIP.AUTO: NEGATIVE
HOLD SPECIMEN: NORMAL
IMM GRANULOCYTES # BLD AUTO: 0.02 10*3/MM3 (ref 0–0.05)
IMM GRANULOCYTES NFR BLD AUTO: 0.5 % (ref 0–0.5)
KETONES UR QL STRIP: ABNORMAL
LEUKOCYTE ESTERASE UR QL STRIP.AUTO: NEGATIVE
LYMPHOCYTES # BLD AUTO: 0.84 10*3/MM3 (ref 0.7–3.1)
LYMPHOCYTES NFR BLD AUTO: 19.4 % (ref 19.6–45.3)
MCH RBC QN AUTO: 30.5 PG (ref 26.6–33)
MCHC RBC AUTO-ENTMCNC: 35.2 G/DL (ref 31.5–35.7)
MCV RBC AUTO: 86.6 FL (ref 79–97)
MONOCYTES # BLD AUTO: 0.54 10*3/MM3 (ref 0.1–0.9)
MONOCYTES NFR BLD AUTO: 12.4 % (ref 5–12)
NEUTROPHILS NFR BLD AUTO: 2.87 10*3/MM3 (ref 1.7–7)
NEUTROPHILS NFR BLD AUTO: 66.1 % (ref 42.7–76)
NITRITE UR QL STRIP: NEGATIVE
NRBC BLD AUTO-RTO: 0 /100 WBC (ref 0–0.2)
PH UR STRIP.AUTO: 6 [PH] (ref 5–8)
PLATELET # BLD AUTO: 300 10*3/MM3 (ref 140–450)
PMV BLD AUTO: 10 FL (ref 6–12)
PROT UR QL STRIP: ABNORMAL
RBC # BLD AUTO: 4.26 10*6/MM3 (ref 3.77–5.28)
SP GR UR STRIP: >1.03 (ref 1–1.03)
UROBILINOGEN UR QL STRIP: ABNORMAL
WBC NRBC COR # BLD AUTO: 4.34 10*3/MM3 (ref 3.4–10.8)

## 2025-01-28 PROCEDURE — 80050 GENERAL HEALTH PANEL: CPT

## 2025-01-28 PROCEDURE — 99395 PREV VISIT EST AGE 18-39: CPT | Performed by: FAMILY MEDICINE

## 2025-01-28 PROCEDURE — 83036 HEMOGLOBIN GLYCOSYLATED A1C: CPT | Performed by: FAMILY MEDICINE

## 2025-01-28 PROCEDURE — 83036 HEMOGLOBIN GLYCOSYLATED A1C: CPT

## 2025-01-28 PROCEDURE — 82607 VITAMIN B-12: CPT

## 2025-01-28 PROCEDURE — 82306 VITAMIN D 25 HYDROXY: CPT

## 2025-01-28 PROCEDURE — 81001 URINALYSIS AUTO W/SCOPE: CPT
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
## 2025-01-29 LAB
25(OH)D3 SERPL-MCNC: 24.8 NG/ML (ref 30–100)
ALBUMIN SERPL-MCNC: 4.3 G/DL (ref 3.5–5.2)
ALBUMIN/GLOB SERPL: 1.3 G/DL
ALP SERPL-CCNC: 93 U/L (ref 39–117)
ALT SERPL W P-5'-P-CCNC: 23 U/L (ref 1–33)
ANION GAP SERPL CALCULATED.3IONS-SCNC: 11 MMOL/L (ref 5–15)
AST SERPL-CCNC: 24 U/L (ref 1–32)
BACTERIA UR QL AUTO: ABNORMAL /HPF
BILIRUB SERPL-MCNC: 1.1 MG/DL (ref 0–1.2)
BUN SERPL-MCNC: 9 MG/DL (ref 6–20)
BUN/CREAT SERPL: 13.4 (ref 7–25)
CALCIUM SPEC-SCNC: 9.1 MG/DL (ref 8.6–10.5)
CHLORIDE SERPL-SCNC: 101 MMOL/L (ref 98–107)
CO2 SERPL-SCNC: 25 MMOL/L (ref 22–29)
COD CRY URNS QL: PRESENT /HPF
CREAT SERPL-MCNC: 0.67 MG/DL (ref 0.57–1)
EGFRCR SERPLBLD CKD-EPI 2021: 115.6 ML/MIN/1.73
GLOBULIN UR ELPH-MCNC: 3.2 GM/DL
GLUCOSE SERPL-MCNC: 77 MG/DL (ref 65–99)
HYALINE CASTS UR QL AUTO: ABNORMAL /LPF
POTASSIUM SERPL-SCNC: 3.3 MMOL/L (ref 3.5–5.2)
PROT SERPL-MCNC: 7.5 G/DL (ref 6–8.5)
RBC # UR STRIP: ABNORMAL /HPF
REF LAB TEST METHOD: ABNORMAL
SODIUM SERPL-SCNC: 137 MMOL/L (ref 136–145)
SQUAMOUS #/AREA URNS HPF: ABNORMAL /HPF
TSH SERPL DL<=0.05 MIU/L-ACNC: 1 UIU/ML (ref 0.27–4.2)
VIT B12 BLD-MCNC: 764 PG/ML (ref 211–946)
WBC # UR STRIP: ABNORMAL /HPF

## 2025-02-04 ENCOUNTER — TELEPHONE (OUTPATIENT)
Dept: FAMILY MEDICINE CLINIC | Facility: CLINIC | Age: 40
End: 2025-02-04
Payer: COMMERCIAL

## 2025-02-04 LAB
EXPIRATION DATE: ABNORMAL
HBA1C MFR BLD: 5.8 % (ref 4.5–5.7)
Lab: ABNORMAL

## 2025-02-04 NOTE — TELEPHONE ENCOUNTER
HUB TO RELAY:    ----- Message from Hodan Mohr sent at 2/4/2025  2:09 PM EST -----  The results of all of the labs that we did in the office look normal. We will make no changes to your current plan of care or medications that we discussed in the office.  Although some of your labs may be outside the range of normal these are non-co  ncerning findings.    If you have any questions or concerns, please don't hesitate to call.    If she is having uti symptoms we can recheck ua.

## 2025-02-04 NOTE — TELEPHONE ENCOUNTER
Name: Darlene Lira      Relationship: Self      Best Callback Number: [phone]       HUB PROVIDED THE RELAY MESSAGE FROM THE OFFICE      PATIENT: VOICED UNDERSTANDING AND HAS NO FURTHER QUESTIONS AT THIS TIME    ADDITIONAL INFORMATION:

## 2025-07-03 RX ORDER — LOSARTAN POTASSIUM 100 MG/1
100 TABLET ORAL DAILY
Qty: 30 TABLET | Refills: 2 | Status: SHIPPED | OUTPATIENT
Start: 2025-07-03

## 2025-07-03 NOTE — TELEPHONE ENCOUNTER
Per Dr Penn-ok to fill but she needs a PCP to manage this medication. I've sent the pt a my chart message explaining this to her.  Sent 3 months per Dr Penn

## (undated) DEVICE — GLV SURG BIOGEL LTX PF 7 1/2

## (undated) DEVICE — SOL IRR NACL 0.9PCT BT 1000ML

## (undated) DEVICE — SUT VIC 2/0 CT1 27IN J339H BX/36

## (undated) DEVICE — ENSEAL 20 CM SHAFT, LARGE JAW: Brand: ENSEAL X1

## (undated) DEVICE — SUT PLAIN  3/0 CT1 27IN 842H

## (undated) DEVICE — 4-PORT MANIFOLD: Brand: NEPTUNE 2

## (undated) DEVICE — SUT GUT PLN 0 STD TIE 54IN S104H

## (undated) DEVICE — PATIENT RETURN ELECTRODE, SINGLE-USE, CONTACT QUALITY MONITORING, ADULT, WITH 9FT CORD, FOR PATIENTS WEIGING OVER 33LBS. (15KG): Brand: MEGADYNE

## (undated) DEVICE — COATED VICRYL  (POLYGLACTIN 910) SUTURE, VIOLET BRAIDED, STERILE, SYNTHETIC ABSORBABLE SUTURE: Brand: COATED VICRYL

## (undated) DEVICE — SUT VIC 3/0 PS2 27IN J427H

## (undated) DEVICE — APPL CHLORAPREP TINTED 26ML TEAL

## (undated) DEVICE — TRY SPINE BLCK WHITACRE 25G 3X5IN

## (undated) DEVICE — PENCL SMOKE/EVAC MEGADYNE TELESCP 10FT

## (undated) DEVICE — TRAP FLD MINIVAC MEGADYNE 100ML

## (undated) DEVICE — MAT PREVALON MOBL TRANSFR AIR W/PAD REPROC 39X81IN

## (undated) DEVICE — BOWL UTIL STRL 32OZ

## (undated) DEVICE — ADHS LIQ MASTISOL 2/3ML

## (undated) DEVICE — SOL IRR H2O BTL 1000ML STRL

## (undated) DEVICE — 3M™ STERI-STRIP™ REINFORCED ADHESIVE SKIN CLOSURES, R1547, 1/2 IN X 4 IN (12 MM X 100 MM), 6 STRIPS/ENVELOPE: Brand: 3M™ STERI-STRIP™

## (undated) DEVICE — PROXIMATE RH ROTATING HEAD SKIN STAPLERS (35 WIDE) CONTAINS 35 STAINLESS STEEL STAPLES: Brand: PROXIMATE

## (undated) DEVICE — PK C/SECT 10

## (undated) DEVICE — CLTH CLENS READYCLEANSE PERI CARE PK/5

## (undated) DEVICE — SUT GUT CHRM 1 CTX 36IN 905H